# Patient Record
Sex: MALE | Race: WHITE | NOT HISPANIC OR LATINO | Employment: OTHER | URBAN - NONMETROPOLITAN AREA
[De-identification: names, ages, dates, MRNs, and addresses within clinical notes are randomized per-mention and may not be internally consistent; named-entity substitution may affect disease eponyms.]

---

## 2017-10-25 ENCOUNTER — OFFICE VISIT (OUTPATIENT)
Dept: CARDIOLOGY | Facility: CLINIC | Age: 52
End: 2017-10-25

## 2017-10-25 VITALS
HEIGHT: 70 IN | BODY MASS INDEX: 35.43 KG/M2 | DIASTOLIC BLOOD PRESSURE: 86 MMHG | SYSTOLIC BLOOD PRESSURE: 134 MMHG | OXYGEN SATURATION: 95 % | HEART RATE: 82 BPM | WEIGHT: 247.5 LBS

## 2017-10-25 DIAGNOSIS — G47.33 OBSTRUCTIVE SLEEP APNEA SYNDROME: ICD-10-CM

## 2017-10-25 DIAGNOSIS — E11.9 TYPE 2 DIABETES MELLITUS WITHOUT COMPLICATION, WITHOUT LONG-TERM CURRENT USE OF INSULIN (HCC): ICD-10-CM

## 2017-10-25 DIAGNOSIS — I10 ESSENTIAL HYPERTENSION: Primary | ICD-10-CM

## 2017-10-25 DIAGNOSIS — E78.2 MIXED HYPERLIPIDEMIA: ICD-10-CM

## 2017-10-25 PROCEDURE — 99213 OFFICE O/P EST LOW 20 MIN: CPT | Performed by: NURSE PRACTITIONER

## 2017-10-25 NOTE — PATIENT INSTRUCTIONS
Calorie Counting for Weight Loss  Calories are energy you get from the things you eat and drink. Your body uses this energy to keep you going throughout the day. The number of calories you eat affects your weight. When you eat more calories than your body needs, your body stores the extra calories as fat. When you eat fewer calories than your body needs, your body burns fat to get the energy it needs.  Calorie counting means keeping track of how many calories you eat and drink each day. If you make sure to eat fewer calories than your body needs, you should lose weight. In order for calorie counting to work, you will need to eat the number of calories that are right for you in a day to lose a healthy amount of weight per week. A healthy amount of weight to lose per week is usually 1-2 lb (0.5-0.9 kg). A dietitian can determine how many calories you need in a day and give you suggestions on how to reach your calorie goal.   WHAT IS MY MY PLAN?  My goal is to have __________ calories per day.   If I have this many calories per day, I should lose around __________ pounds per week.  WHAT DO I NEED TO KNOW ABOUT CALORIE COUNTING?  In order to meet your daily calorie goal, you will need to:  · Find out how many calories are in each food you would like to eat. Try to do this before you eat.  · Decide how much of the food you can eat.  · Write down what you ate and how many calories it had. Doing this is called keeping a food log.  WHERE DO I FIND CALORIE INFORMATION?  The number of calories in a food can be found on a Nutrition Facts label. Note that all the information on a label is based on a specific serving of the food. If a food does not have a Nutrition Facts label, try to look up the calories online or ask your dietitian for help.  HOW DO I DECIDE HOW MUCH TO EAT?  To decide how much of the food you can eat, you will need to consider both the number of calories in one serving and the size of one serving. This  information can be found on the Nutrition Facts label. If a food does not have a Nutrition Facts label, look up the information online or ask your dietitian for help.  Remember that calories are listed per serving. If you choose to have more than one serving of a food, you will have to multiply the calories per serving by the amount of servings you plan to eat. For example, the label on a package of bread might say that a serving size is 1 slice and that there are 90 calories in a serving. If you eat 1 slice, you will have eaten 90 calories. If you eat 2 slices, you will have eaten 180 calories.  HOW DO I KEEP A FOOD LOG?  After each meal, record the following information in your food log:  · What you ate.  · How much of it you ate.  · How many calories it had.  · Then, add up your calories.  Keep your food log near you, such as in a small notebook in your pocket. Another option is to use a mobile debby or website. Some programs will calculate calories for you and show you how many calories you have left each time you add an item to the log.  WHAT ARE SOME CALORIE COUNTING TIPS?  · Use your calories on foods and drinks that will fill you up and not leave you hungry. Some examples of this include foods like nuts and nut butters, vegetables, lean proteins, and high-fiber foods (more than 5 g fiber per serving).  · Eat nutritious foods and avoid empty calories. Empty calories are calories you get from foods or beverages that do not have many nutrients, such as candy and soda. It is better to have a nutritious high-calorie food (such as an avocado) than a food with few nutrients (such as a bag of chips).  · Know how many calories are in the foods you eat most often. This way, you do not have to look up how many calories they have each time you eat them.  · Look out for foods that may seem like low-calorie foods but are really high-calorie foods, such as baked goods, soda, and fat-free candy.  · Pay attention to calories  in drinks. Drinks such as sodas, specialty coffee drinks, alcohol, and juices have a lot of calories yet do not fill you up. Choose low-calorie drinks like water and diet drinks.  · Focus your calorie counting efforts on higher calorie items. Logging the calories in a garden salad that contains only vegetables is less important than calculating the calories in a milk shake.  · Find a way of tracking calories that works for you. Get creative. Most people who are successful find ways to keep track of how much they eat in a day, even if they do not count every calorie.  WHAT ARE SOME PORTION CONTROL TIPS?  · Know how many calories are in a serving. This will help you know how many servings of a certain food you can have.  · Use a measuring cup to measure serving sizes. This is helpful when you start out. With time, you will be able to estimate serving sizes for some foods.  · Take some time to put servings of different foods on your favorite plates, bowls, and cups so you know what a serving looks like.  · Try not to eat straight from a bag or box. Doing this can lead to overeating. Put the amount you would like to eat in a cup or on a plate to make sure you are eating the right portion.  · Use smaller plates, glasses, and bowls to prevent overeating. This is a quick and easy way to practice portion control. If your plate is smaller, less food can fit on it.  · Try not to multitask while eating, such as watching TV or using your computer. If it is time to eat, sit down at a table and enjoy your food. Doing this will help you to start recognizing when you are full. It will also make you more aware of what and how much you are eating.  HOW CAN I CALORIE COUNT WHEN EATING OUT?  · Ask for smaller portion sizes or child-sized portions.  · Consider sharing an entree and sides instead of getting your own entree.  · If you get your own entree, eat only half. Ask for a box at the beginning of your meal and put the rest of your  "entree in it so you are not tempted to eat it.  · Look for the calories on the menu. If calories are listed, choose the lower calorie options.  · Choose dishes that include vegetables, fruits, whole grains, low-fat dairy products, and lean protein. Focusing on smart food choices from each of the 5 food groups can help you stay on track at restaurants.  · Choose items that are boiled, broiled, grilled, or steamed.  · Choose water, milk, unsweetened iced tea, or other drinks without added sugars. If you want an alcoholic beverage, choose a lower calorie option. For example, a regular wendie can have up to 700 calories and a glass of wine has around 150.  · Stay away from items that are buttered, battered, fried, or served with cream sauce. Items labeled \"crispy\" are usually fried, unless stated otherwise.  · Ask for dressings, sauces, and syrups on the side. These are usually very high in calories, so do not eat much of them.  · Watch out for salads. Many people think salads are a healthy option, but this is often not the case. Many salads come with chaidez, fried chicken, lots of cheese, fried chips, and dressing. All of these items have a lot of calories. If you want a salad, choose a garden salad and ask for grilled meats or steak. Ask for the dressing on the side, or ask for olive oil and vinegar or lemon to use as dressing.  · Estimate how many servings of a food you are given. For example, a serving of cooked rice is ½ cup or about the size of half a tennis ball or one cupcake wrapper. Knowing serving sizes will help you be aware of how much food you are eating at restaurants. The list below tells you how big or small some common portion sizes are based on everyday objects.    1 oz--4 stacked dice.    3 oz--1 deck of cards.    1 tsp--1 dice.    1 Tbsp--½ a Ping-Pong ball.    2 Tbsp--1 Ping-Pong ball.    ½ cup--1 tennis ball or 1 cupcake wrapper.    1 cup--1 baseball.     This information is not intended to " replace advice given to you by your health care provider. Make sure you discuss any questions you have with your health care provider.     Document Released: 12/18/2006 Document Revised: 01/08/2016 Document Reviewed: 10/23/2014  [employer] Interactive Patient Education ©2017 [employer]

## 2017-10-25 NOTE — PROGRESS NOTES
Chief Complaint   Patient presents with   • Follow-up     For cardiac management   • Med Refill     PCP refills medication       Subjective       Jann Barnard is a 52 y.o. male with a history of diabetes, hypertension, hypercholesteremia, and mild metabolic syndrome. He was in 2015 for shortness of breath and fatigue.  His stress was negative for ischemia.  Coreg was added for better blood pressure and heart rate control.  He was advised at last visit to see Dr. Veliz for pulmonary management, but patient reported he had not been following with her, and can not wear his CPAP mask.    Today he comes to the office for a follow up visit and denies chest pain, palpitations or shortness of breath. He states he has felt better since retiring, but does continue to work at a less stressful job he enjoys.     HPI         Cardiac History:    Past Surgical History:   Procedure Laterality Date   • CARDIOVASCULAR STRESS TEST  02/26/2015    Stress Myoview- 9 min, 96% THR. 170/80. negative for ischemia, coreg added, recommended eval for sleep apnea   • ECHO - CONVERTED  02/26/2015    Echo- EF 65%, RVSP- 33mmHg       Current Outpatient Prescriptions   Medication Sig Dispense Refill   • carvedilol (COREG) 3.125 MG tablet Take 3.125 mg by mouth 2 (two) times a day with meals.     • glyBURIDE (DIABETA) 5 MG tablet Take 5 mg by mouth daily.     • lisinopril-hydrochlorothiazide (PRINZIDE,ZESTORETIC) 20-25 MG per tablet Take 1 tablet by mouth daily.     • metFORMIN (GLUCOPHAGE) 1000 MG tablet Take 1,000 mg by mouth daily with breakfast.     • simvastatin (ZOCOR) 20 MG tablet Take 20 mg by mouth every night.     • tiZANidine (ZANAFLEX) 4 MG tablet Take 4 mg by mouth every 8 (eight) hours as needed.       No current facility-administered medications for this visit.        Review of patient's allergies indicates no known allergies.    Past Medical History:   Diagnosis Date   • DDD (degenerative disc disease), cervical     s/p cervical  surgery   • Diabetes mellitus     non insulin dependent   • Dyslipidemia    • Hx of tonsillectomy    • Hypertension        Social History     Social History   • Marital status:      Spouse name: N/A   • Number of children: N/A   • Years of education: N/A     Occupational History   • Not on file.     Social History Main Topics   • Smoking status: Never Smoker   • Smokeless tobacco: Current User     Types: Snuff   • Alcohol use No   • Drug use: No   • Sexual activity: Not on file     Other Topics Concern   • Not on file     Social History Narrative       Family History   Problem Relation Age of Onset   • Hypertension Mother    • Heart attack Mother    • Diabetes Father    • Hypertension Father    • Hypertension Sister    • Hyperlipidemia Sister        Review of Systems   Constitution: Negative for decreased appetite and malaise/fatigue.   HENT: Negative for congestion and sore throat.    Eyes: Negative for blurred vision.   Cardiovascular: Negative for chest pain, dyspnea on exertion, irregular heartbeat, near-syncope and palpitations.   Respiratory: Positive for sleep disturbances due to breathing (unable to tolerate CPAP) and snoring (wife states better than used to be). Negative for shortness of breath.    Endocrine: Negative for cold intolerance and heat intolerance.   Hematologic/Lymphatic: Negative for adenopathy. Does not bruise/bleed easily.   Skin: Negative for itching and nail changes.   Musculoskeletal: Negative for arthritis and myalgias.   Gastrointestinal: Negative for abdominal pain, dysphagia and heartburn.   Genitourinary: Negative for dysuria and frequency.   Neurological: Negative for dizziness, light-headedness, seizures and vertigo.   Psychiatric/Behavioral: Negative for altered mental status.   Allergic/Immunologic: Negative for environmental allergies and hives.        Diabetes- Yes  Thyroid-normal    Objective     /86 (BP Location: Left arm, Patient Position: Sitting, Cuff Size:  "Adult)  Pulse 82  Ht 70\" (177.8 cm)  Wt 247 lb 8 oz (112 kg)  SpO2 95%  BMI 35.51 kg/m2    Physical Exam   Constitutional: He is oriented to person, place, and time. He appears well-nourished.   HENT:   Head: Normocephalic.   Eyes: Conjunctivae are normal. Pupils are equal, round, and reactive to light.   Neck: Normal range of motion. Neck supple. No JVD present.   Cardiovascular: Normal rate, regular rhythm, S1 normal and normal pulses.    No murmur heard.  Slightly loud S2   Pulmonary/Chest: Effort normal and breath sounds normal. He has no wheezes. He has no rales.   Abdominal: Soft. Bowel sounds are normal. He exhibits no distension. There is no tenderness.   Musculoskeletal: Normal range of motion. He exhibits no edema.   Neurological: He is alert and oriented to person, place, and time.   Skin: Skin is warm and dry. No rash noted. No pallor.   Psychiatric: He has a normal mood and affect. His behavior is normal. Judgment and thought content normal.          Procedures          Assessment/Plan      Jann was seen today for follow-up and med refill.    Diagnoses and all orders for this visit:    Essential hypertension    Mixed hyperlipidemia    Obstructive sleep apnea syndrome    Type 2 diabetes mellitus without complication, without long-term current use of insulin      He was unable to tolerate CPAP therapy for sleep apnea after numerous attempts with different mask. He has been watching his diet better and lost about 10 pounds recently. I encouraged him to continue diet effort and diet information given to him. I also suggested Medeterian type diet. His blood pressure, heart rate and rhythm are normal. He denies cardiac symptoms. Same medications recommended at this time and no cardiac testing warranted.  He follows with you for management of labs and medication refills. We will plan to see him annually or sooner for problems.              Electronically signed by MARY Freitas,  October 25, " 2017 3:45 PM

## 2018-06-26 ENCOUNTER — OUTSIDE FACILITY SERVICE (OUTPATIENT)
Dept: CARDIOLOGY | Facility: CLINIC | Age: 53
End: 2018-06-26

## 2018-06-26 PROCEDURE — 78452 HT MUSCLE IMAGE SPECT MULT: CPT | Performed by: INTERNAL MEDICINE

## 2018-06-26 PROCEDURE — 93018 CV STRESS TEST I&R ONLY: CPT | Performed by: INTERNAL MEDICINE

## 2018-06-26 PROCEDURE — 93306 TTE W/DOPPLER COMPLETE: CPT | Performed by: INTERNAL MEDICINE

## 2018-06-27 ENCOUNTER — OUTSIDE FACILITY SERVICE (OUTPATIENT)
Dept: CARDIOLOGY | Facility: CLINIC | Age: 53
End: 2018-06-27

## 2018-06-27 PROCEDURE — 99213 OFFICE O/P EST LOW 20 MIN: CPT | Performed by: INTERNAL MEDICINE

## 2018-07-17 ENCOUNTER — OFFICE VISIT (OUTPATIENT)
Dept: CARDIOLOGY | Facility: CLINIC | Age: 53
End: 2018-07-17

## 2018-07-17 VITALS
WEIGHT: 246 LBS | HEIGHT: 70 IN | HEART RATE: 88 BPM | BODY MASS INDEX: 35.22 KG/M2 | DIASTOLIC BLOOD PRESSURE: 78 MMHG | SYSTOLIC BLOOD PRESSURE: 126 MMHG

## 2018-07-17 DIAGNOSIS — E78.2 MIXED HYPERLIPIDEMIA: ICD-10-CM

## 2018-07-17 DIAGNOSIS — I10 ESSENTIAL HYPERTENSION: Primary | ICD-10-CM

## 2018-07-17 DIAGNOSIS — G47.33 OBSTRUCTIVE SLEEP APNEA SYNDROME: ICD-10-CM

## 2018-07-17 DIAGNOSIS — E11.9 TYPE 2 DIABETES MELLITUS WITHOUT COMPLICATION, WITHOUT LONG-TERM CURRENT USE OF INSULIN (HCC): ICD-10-CM

## 2018-07-17 PROCEDURE — 99213 OFFICE O/P EST LOW 20 MIN: CPT | Performed by: NURSE PRACTITIONER

## 2018-07-17 RX ORDER — ASPIRIN 81 MG/1
81 TABLET ORAL DAILY
COMMUNITY

## 2018-07-17 RX ORDER — ATORVASTATIN CALCIUM 40 MG/1
40 TABLET, FILM COATED ORAL DAILY
COMMUNITY

## 2018-07-17 RX ORDER — NITROGLYCERIN 0.4 MG/1
0.4 TABLET SUBLINGUAL
COMMUNITY
End: 2021-01-26 | Stop reason: SDUPTHER

## 2018-07-17 NOTE — PATIENT INSTRUCTIONS
Nitroglycerin sublingual tablets  What is this medicine?  NITROGLYCERIN (chang troe GLI ser in) is a type of vasodilator. It relaxes blood vessels, increasing the blood and oxygen supply to your heart. This medicine is used to relieve chest pain caused by angina. It is also used to prevent chest pain before activities like climbing stairs, going outdoors in cold weather, or sexual activity.  This medicine may be used for other purposes; ask your health care provider or pharmacist if you have questions.  COMMON BRAND NAME(S): Nitroquick, Nitrostat, Nitrotab  What should I tell my health care provider before I take this medicine?  They need to know if you have any of these conditions:  -anemia  -head injury, recent stroke, or bleeding in the brain  -liver disease  -previous heart attack  -an unusual or allergic reaction to nitroglycerin, other medicines, foods, dyes, or preservatives  -pregnant or trying to get pregnant  -breast-feeding  How should I use this medicine?  Take this medicine by mouth as needed. At the first sign of an angina attack (chest pain or tightness) place one tablet under your tongue. You can also take this medicine 5 to 10 minutes before an event likely to produce chest pain. Follow the directions on the prescription label. Let the tablet dissolve under the tongue. Do not swallow whole. Replace the dose if you accidentally swallow it. It will help if your mouth is not dry. Saliva around the tablet will help it to dissolve more quickly. Do not eat or drink, smoke or chew tobacco while a tablet is dissolving. If you are not better within 5 minutes after taking ONE dose of nitroglycerin, call 9-1-1 immediately to seek emergency medical care. Do not take more than 3 nitroglycerin tablets over 15 minutes.  If you take this medicine often to relieve symptoms of angina, your doctor or health care professional may provide you with different instructions to manage your symptoms. If symptoms do not go away  after following these instructions, it is important to call 9-1-1 immediately. Do not take more than 3 nitroglycerin tablets over 15 minutes.  Talk to your pediatrician regarding the use of this medicine in children. Special care may be needed.  Overdosage: If you think you have taken too much of this medicine contact a poison control center or emergency room at once.  NOTE: This medicine is only for you. Do not share this medicine with others.  What if I miss a dose?  This does not apply. This medicine is only used as needed.  What may interact with this medicine?  Do not take this medicine with any of the following medications:  -certain migraine medicines like ergotamine and dihydroergotamine (DHE)  -medicines used to treat erectile dysfunction like sildenafil, tadalafil, and vardenafil  -riociguat  This medicine may also interact with the following medications:  -alteplase  -aspirin  -heparin  -medicines for high blood pressure  -medicines for mental depression  -other medicines used to treat angina  -phenothiazines like chlorpromazine, mesoridazine, prochlorperazine, thioridazine  This list may not describe all possible interactions. Give your health care provider a list of all the medicines, herbs, non-prescription drugs, or dietary supplements you use. Also tell them if you smoke, drink alcohol, or use illegal drugs. Some items may interact with your medicine.  What should I watch for while using this medicine?  Tell your doctor or health care professional if you feel your medicine is no longer working.  Keep this medicine with you at all times. Sit or lie down when you take your medicine to prevent falling if you feel dizzy or faint after using it. Try to remain calm. This will help you to feel better faster. If you feel dizzy, take several deep breaths and lie down with your feet propped up, or bend forward with your head resting between your knees.  You may get drowsy or dizzy. Do not drive, use machinery,  or do anything that needs mental alertness until you know how this drug affects you. Do not stand or sit up quickly, especially if you are an older patient. This reduces the risk of dizzy or fainting spells. Alcohol can make you more drowsy and dizzy. Avoid alcoholic drinks.  Do not treat yourself for coughs, colds, or pain while you are taking this medicine without asking your doctor or health care professional for advice. Some ingredients may increase your blood pressure.  What side effects may I notice from receiving this medicine?  Side effects that you should report to your doctor or health care professional as soon as possible:  -blurred vision  -dry mouth  -skin rash  -sweating  -the feeling of extreme pressure in the head  -unusually weak or tired  Side effects that usually do not require medical attention (report to your doctor or health care professional if they continue or are bothersome):  -flushing of the face or neck  -headache  -irregular heartbeat, palpitations  -nausea, vomiting  This list may not describe all possible side effects. Call your doctor for medical advice about side effects. You may report side effects to FDA at 5-082-FDA-8756.  Where should I keep my medicine?  Keep out of the reach of children.  Store at room temperature between 20 and 25 degrees C (68 and 77 degrees F). Store in original container. Protect from light and moisture. Keep tightly closed. Throw away any unused medicine after the expiration date.  NOTE: This sheet is a summary. It may not cover all possible information. If you have questions about this medicine, talk to your doctor, pharmacist, or health care provider.  © 2018 [employer] (2014-10-16 17:57:36)

## 2018-07-17 NOTE — PROGRESS NOTES
Chief Complaint   Patient presents with   • Hospital Follow-up     Patient was in hospital about 3 weeks ago with chest pain. Was given a dose of toradol in the hospital which relieved CP- has not had anymore episodes since. PCP refills meds. Didn't bring med list.        Subjective       Jann Barnard is a 53 y.o. male  with a history of diabetes, hypertension, hypercholesteremia, and mild metabolic syndrome. He was in 2015 for shortness of breath and fatigue.  His stress was negative for ischemia.  Coreg was added for better blood pressure and heart rate control.  He was advised to see Dr. Veliz for pulmonary management, but he did not as he states unable to wear CPAP mask. He reports after snf from his work, he has felt better. IN June 2018, he presented to ER at [facility] with complaint of severe chest pain. Labs were negative. Stress and echo were done and no ischemia noted. After treatment with IV Toradol, the pain resolved.   Today he comes to the office for a hospital follow up visit and denies reoccurrence of chest pain. He has resumed his normal daily activities without issue. No palpitations, dizziness or shortness of breath reported. His wife states he has been maintaining healthier diet.     HPI     Cardiac History:    Past Surgical History:   Procedure Laterality Date   • CARDIOVASCULAR STRESS TEST  02/26/2015    Stress Myoview- 9 min, 96% THR. 170/80. negative for ischemia, coreg added, recommended eval for sleep apnea   • ECHO - CONVERTED  02/26/2015    Echo- EF 65%, RVSP- 33mmHg       Current Outpatient Prescriptions   Medication Sig Dispense Refill   • aspirin 81 MG EC tablet Take 81 mg by mouth Daily.     • atorvastatin (LIPITOR) 40 MG tablet Take 40 mg by mouth Daily.     • carvedilol (COREG) 3.125 MG tablet Take 3.125 mg by mouth 2 (two) times a day with meals.     • glyBURIDE (DIABETA) 5 MG tablet Take 5 mg by mouth daily.     • lisinopril-hydrochlorothiazide (PRINZIDE,ZESTORETIC) 20-25 MG  per tablet Take 1 tablet by mouth daily.     • metFORMIN (GLUCOPHAGE) 1000 MG tablet Take 1,000 mg by mouth daily with breakfast.     • nitroglycerin (NITROSTAT) 0.4 MG SL tablet Place 0.4 mg under the tongue Every 5 (Five) Minutes As Needed for Chest Pain. Take no more than 3 doses in 15 minutes.     • tiZANidine (ZANAFLEX) 4 MG tablet Take 4 mg by mouth every 8 (eight) hours as needed.       No current facility-administered medications for this visit.        Patient has no known allergies.    Past Medical History:   Diagnosis Date   • DDD (degenerative disc disease), cervical     s/p cervical surgery   • Diabetes mellitus (CMS/HCC)     non insulin dependent   • Dyslipidemia    • Hx of tonsillectomy    • Hypertension        Social History     Social History   • Marital status:      Spouse name: N/A   • Number of children: N/A   • Years of education: N/A     Occupational History   • Not on file.     Social History Main Topics   • Smoking status: Never Smoker   • Smokeless tobacco: Current User     Types: Snuff   • Alcohol use No   • Drug use: No   • Sexual activity: Not on file     Other Topics Concern   • Not on file     Social History Narrative   • No narrative on file       Family History   Problem Relation Age of Onset   • Hypertension Mother    • Heart attack Mother    • Diabetes Father    • Hypertension Father    • Hypertension Sister    • Hyperlipidemia Sister        Review of Systems   Constitution: Negative for decreased appetite and malaise/fatigue.   HENT: Negative for congestion, hoarse voice, nosebleeds and sore throat.    Eyes: Negative for blurred vision.   Cardiovascular: Negative for chest pain, leg swelling, near-syncope and palpitations.   Respiratory: Positive for snoring (wife states has been better). Negative for shortness of breath.    Endocrine: Negative for cold intolerance and heat intolerance.   Hematologic/Lymphatic: Negative for bleeding problem. Does not bruise/bleed easily.  "  Skin: Negative for color change, dry skin and itching.   Musculoskeletal: Positive for back pain (by history) and joint pain. Negative for myalgias.   Gastrointestinal: Negative for abdominal pain, change in bowel habit, dysphagia, heartburn, melena and nausea.   Genitourinary: Negative for dysuria and hematuria.   Neurological: Negative for dizziness and light-headedness.   Psychiatric/Behavioral: Negative for altered mental status. The patient does not have insomnia.    Allergic/Immunologic: Negative for environmental allergies and hives.        Objective     /78   Pulse 88   Ht 177.8 cm (70\")   Wt 112 kg (246 lb)   BMI 35.30 kg/m²     Physical Exam   Constitutional: He is oriented to person, place, and time. He appears well-developed and well-nourished.   HENT:   Head: Normocephalic.   Eyes: Conjunctivae are normal. Pupils are equal, round, and reactive to light.   Neck: Normal range of motion. Neck supple. Carotid bruit is not present.   Cardiovascular: Normal rate, regular rhythm, S1 normal and S2 normal.    No murmur heard.  Pulmonary/Chest: Breath sounds normal.   Abdominal: Soft. Bowel sounds are normal.   Musculoskeletal: Normal range of motion.   Neurological: He is alert and oriented to person, place, and time.   Skin: Skin is warm and dry. No pallor.   Psychiatric: He has a normal mood and affect. His behavior is normal.   Vitals reviewed.       Procedures :none today        Assessment/Plan      Jann was seen today for hospital follow-up.    Diagnoses and all orders for this visit:    Essential hypertension    Mixed hyperlipidemia    Type 2 diabetes mellitus without complication, without long-term current use of insulin (CMS/[facility])    Obstructive sleep apnea syndrome       The reports of his recent stress test and echocardiogram were reviewed which showed normal LV function and no ischemia. Advised to continue daily aspirin for cardiac prevention.     He asked if he should take Pepcid which " was mentioned during hospital stay. He currently denies any GERD type symptoms. I advised him to further discuss with you.     Blood pressure, heart rate and rhythm are normal today. Continue same does Coreg, and Zestoretic.     For lipid management, his statin was recently changed to Lipitor. He is taking without side effects noted. I advised him to continue the same and he will follow with you for management of labs.      Patient's Body mass index is 35.3 kg/m². BMI is above normal parameters. Recommendations include: nutrition counseling. Diet for weight loss encouraged as well as diabetic/heart healthy diet.       He has history of sleep apnea but unable to tolerate CPAP therapy. He denies any worsen symptoms of hypoxia. No morning fatigue or headaches noted. Weight loss encouraged to also help control symptoms of sleep apnea.     We will plan to see him back in 6 months. Please call sooner for problems.            Electronically signed by MARY Freitas,  July 17, 2018 4:05 PM

## 2019-01-22 ENCOUNTER — OFFICE VISIT (OUTPATIENT)
Dept: CARDIOLOGY | Facility: CLINIC | Age: 54
End: 2019-01-22

## 2019-01-22 VITALS
DIASTOLIC BLOOD PRESSURE: 78 MMHG | HEIGHT: 70 IN | WEIGHT: 246 LBS | BODY MASS INDEX: 35.22 KG/M2 | SYSTOLIC BLOOD PRESSURE: 118 MMHG | HEART RATE: 80 BPM

## 2019-01-22 DIAGNOSIS — G47.33 OBSTRUCTIVE SLEEP APNEA SYNDROME: ICD-10-CM

## 2019-01-22 DIAGNOSIS — E66.9 OBESITY (BMI 30-39.9): ICD-10-CM

## 2019-01-22 DIAGNOSIS — I10 ESSENTIAL HYPERTENSION: Primary | ICD-10-CM

## 2019-01-22 DIAGNOSIS — E11.9 TYPE 2 DIABETES MELLITUS WITHOUT COMPLICATION, WITHOUT LONG-TERM CURRENT USE OF INSULIN (HCC): ICD-10-CM

## 2019-01-22 DIAGNOSIS — E78.2 MIXED HYPERLIPIDEMIA: ICD-10-CM

## 2019-01-22 PROCEDURE — 99213 OFFICE O/P EST LOW 20 MIN: CPT | Performed by: NURSE PRACTITIONER

## 2019-01-22 NOTE — PATIENT INSTRUCTIONS
What You Need to Know About Smokeless Tobacco Use  Tobacco use is one of the leading causes of cancer and other chronic health problems. Smokeless tobacco is tobacco that is put directly into the mouth instead of being smoked. It may also be called chewing tobacco or snuff. Smokeless tobacco is made from the leaves of tobacco plants and it comes in several forms:  · Loose, dry leaves, plugs, or twists.  · Moist pouches.  · Dissolving lozenges or strips.    Chewing, sucking, or holding the tobacco in your mouth causes your mouth to make more saliva. The saliva mixes with the tobacco to make “tobacco juice” that is swallowed or spit out.  How can smokeless tobacco affect me?  Using smokeless tobacco:  · Increases your risk of developing cancer. Smokeless tobacco contains at least 28 different types of cancer-causing chemicals (carcinogens).  · Increases your chances of developing other long-term health problems, including high blood pressure, heart disease, stroke, and dental problems.  · Can make you become addicted. Nicotine is one of the chemicals in tobacco. When you chew tobacco, you absorb nicotine from the tobacco juice. This can make you feel more alert than usual.  · Can cause problems with pregnancy. Pregnant women who use smokeless tobacco are more likely to miscarry or deliver a baby too early (premature delivery).  · Can affect the appearance and health of your mouth. Using smokeless tobacco may cause bad breath, yellow-brown teeth, mouth sores, cracking and bleeding lips, gum recession, and lesions on the soft tissues of your mouth (leukoplakia).    What are the benefits of not using smokeless tobacco?  The benefits of not using smokeless tobacco include:  · A healthy mind because:  ? You avoid addiction.  · A healthy body because:  ? You avoid dental problems.  ? You promote healthy pregnancy.  ? You avoid long-term health problems.  · A healthy wallet because:  ? You avoid costs of buying  tobacco.  ? You avoid health care costs in the future.  · A healthy family because:  ? You avoid accidental poisoning of children in your household.    What can happen if I continue to use smokeless tobacco?  If you continue to use smokeless tobacco, you will increase your risk for developing certain cancers. These include:  · Tongue.  · Lips, mouth, and gums.  · Throat (esophagus) and voice box (larynx).  · Stomach.  · Pancreas.  · Bladder.  · Colon.    Long-term use of smokeless tobacco can also lead to:  · High blood pressure, heart disease, and stroke.  · Gum disease, gum recession, and bone loss around the teeth.  · Tooth decay.    How do I quit using smokeless tobacco?  Quitting the use of smokeless tobacco can be hard, but it can be done. Follow these steps:  · Pick a date to quit. Set a date within the next two weeks. This gives you time to prepare.  · Write down the reasons why you are quitting. Keep this list in places where you will see it often, such as on your bathroom mirror or in your car or wallet.  · Identify the people, places, things, and activities that make you want to use tobacco (triggers) and avoid them.  · Get rid of any tobacco you have and remove any tobacco smells. To do this:  ? Throw away all containers of tobacco at home, at work, and in your car.  ? Throw away any other items that you use regularly when you chew tobacco.  ? Clean your car and make sure to remove all tobacco-related items.  ? Clean your home, including curtains and carpets.  · Tell your family, friends, and coworkers that you are quitting. This can make quitting easier.  · Ask your health care provider for help quitting smokeless tobacco. This may involve treatment. Find out what treatment options are covered by your health insurance.  · Keep track of how many days have passed since you quit. Remembering how long and hard you have worked to quit can help you avoid using tobacco again.    Where can I get support?  Ask  your health care provider if there is a local support group for quitting smokeless tobacco.  Where can I get more information?  You can learn more about the risks of using smokeless tobacco and the benefits of quitting from these sources:  · National [employer]: www.cancer.gov  · American Cancer Society: www.cancer.org    When should I seek medical care?  Seek medical care if you have:  · White or other discolored patches in your mouth.  · Difficulty swallowing.  · A change in your voice.  · Unexplained weight loss.  · Stomach pain, nausea, or vomiting.    Summary  · Smokeless tobacco contains at least 28 different chemicals that are known to cause cancer (carcinogen).  · Nicotine is an addictive chemical in smokeless tobacco.  · When you quit using smokeless tobacco, you lower your risk of developing cancer.  This information is not intended to replace advice given to you by your health care provider. Make sure you discuss any questions you have with your health care provider.  Document Released: 05/21/2012 Document Revised: 08/12/2017 Document Reviewed: 07/29/2016  [employer] Interactive Patient Education © 2018 [employer]

## 2019-01-22 NOTE — PROGRESS NOTES
Chief Complaint   Patient presents with   • Follow-up     cardiac managment.  Pt is on daily ASA.  Pt doing well.  No more CP.     • Med Refill     no refills needed.  PCP fills meds.  Verbally went over meds.       Subjective       Jann Barnard is a 53 y.o. male  with a history of diabetes, hypertension, hypercholesteremia, and mild metabolic syndrome. He was in 2015 for shortness of breath and fatigue.  His stress was negative for ischemia.  Coreg was added for better blood pressure and heart rate control.  He was advised to see Dr. Veliz for pulmonary management, but he did not as he states unable to wear CPAP mask. He reports after [facility] from his work, he has felt better. IN June 2018, he presented to ER at [facility] with complaint of severe chest pain. Labs were negative. Stress and echo were done and no ischemia noted. After treatment with IV Toradol, the pain resolved.     Today he comes to the office for a follow up visit. He states he has been feeling well since his last visit, no chest pain, palpitations, dizziness or shortness of breath. He continues to work and farm. No recent medication changes reported.     HPI     Cardiac History:    Past Surgical History:   Procedure Laterality Date   • CARDIOVASCULAR STRESS TEST  02/26/2015    Stress Myoview- 9 min, 96% THR. 170/80. negative for ischemia, coreg added, recommended eval for sleep apnea   • CARDIOVASCULAR STRESS TEST  06/26/2018    Lexiscan- negative for ischemia   • ECHO - CONVERTED  02/26/2015    Echo- EF 65%, RVSP- 33mmHg   • ECHO - CONVERTED  06/26/2018    LVEF 60-65%, normal diastolic function, trace to mild MR       Current Outpatient Medications   Medication Sig Dispense Refill   • aspirin 81 MG EC tablet Take 81 mg by mouth Daily.     • atorvastatin (LIPITOR) 40 MG tablet Take 40 mg by mouth Daily.     • carvedilol (COREG) 3.125 MG tablet Take 3.125 mg by mouth 2 (two) times a day with meals.     • glyBURIDE (DIABETA) 5 MG tablet Take 5 mg  by mouth daily.     • lisinopril-hydrochlorothiazide (PRINZIDE,ZESTORETIC) 20-25 MG per tablet Take 1 tablet by mouth daily.     • metFORMIN (GLUCOPHAGE) 1000 MG tablet Take 1,000 mg by mouth daily with breakfast.     • nitroglycerin (NITROSTAT) 0.4 MG SL tablet Place 0.4 mg under the tongue Every 5 (Five) Minutes As Needed for Chest Pain. Take no more than 3 doses in 15 minutes.     • tiZANidine (ZANAFLEX) 4 MG tablet Take 4 mg by mouth every 8 (eight) hours as needed.       No current facility-administered medications for this visit.        Patient has no known allergies.    Past Medical History:   Diagnosis Date   • DDD (degenerative disc disease), cervical     s/p cervical surgery   • Diabetes mellitus (CMS/HCC)     non insulin dependent   • Dyslipidemia    • Hx of tonsillectomy    • Hypertension        Social History     Socioeconomic History   • Marital status:      Spouse name: Not on file   • Number of children: Not on file   • Years of education: Not on file   • Highest education level: Not on file   Social Needs   • Financial resource strain: Not on file   • Food insecurity - worry: Not on file   • Food insecurity - inability: Not on file   • Transportation needs - medical: Not on file   • Transportation needs - non-medical: Not on file   Occupational History   • Not on file   Tobacco Use   • Smoking status: Never Smoker   • Smokeless tobacco: Current User     Types: Snuff   Substance and Sexual Activity   • Alcohol use: No   • Drug use: No   • Sexual activity: Not on file   Other Topics Concern   • Not on file   Social History Narrative   • Not on file       Family History   Problem Relation Age of Onset   • Hypertension Mother    • Heart attack Mother    • Diabetes Father    • Hypertension Father    • Hypertension Sister    • Hyperlipidemia Sister        Review of Systems   Constitution: Negative for decreased appetite, weakness and malaise/fatigue.   HENT: Negative for congestion and nosebleeds.   "  Eyes: Negative for redness and visual disturbance.   Cardiovascular: Negative for chest pain, irregular heartbeat, leg swelling, near-syncope and palpitations.   Respiratory: Positive for shortness of breath.    Endocrine: Negative for polydipsia, polyphagia and polyuria.   Hematologic/Lymphatic: Negative for bleeding problem. Does not bruise/bleed easily.   Skin: Negative for color change, dry skin and itching.   Musculoskeletal: Positive for joint pain. Negative for falls.   Gastrointestinal: Negative for abdominal pain, dysphagia and heartburn.   Genitourinary: Negative for dysuria and hematuria.   Neurological: Negative for dizziness, headaches and loss of balance.   Psychiatric/Behavioral: The patient does not have insomnia and is not nervous/anxious.         Objective     /78 (BP Location: Right arm)   Pulse 80   Ht 177.8 cm (70\")   Wt 112 kg (246 lb)   BMI 35.30 kg/m²     Physical Exam   Constitutional: He is oriented to person, place, and time. He appears well-nourished.   HENT:   Head: Normocephalic.   Eyes: Conjunctivae are normal. Pupils are equal, round, and reactive to light.   Neck: Normal range of motion. Neck supple.   Cardiovascular: Normal rate, regular rhythm, S1 normal and S2 normal.   No murmur heard.  Pulmonary/Chest: Breath sounds normal. He has no wheezes. He has no rales.   Abdominal: Soft. Bowel sounds are normal.   Musculoskeletal: Normal range of motion. He exhibits no edema.   Neurological: He is alert and oriented to person, place, and time.   Skin: Skin is warm and dry. No pallor.   Psychiatric: He has a normal mood and affect.      Procedures: none today      Assessment/Plan      Jann was seen today for follow-up and med refill.    Diagnoses and all orders for this visit:    Essential hypertension    Mixed hyperlipidemia    Obstructive sleep apnea syndrome    Type 2 diabetes mellitus without complication, without long-term current use of insulin (CMS/[facility])    Obesity " (BMI 30-39.9)      His blood pressure, heart rate and rhythm are normal. Advised to continue same dose Coreg, Lisinopril and HCTZ.     For cholesterol, he is taking Lipitor and will follow with you for lab orders including lipid panel and LFT.     Patient's Body mass index is 35.3 kg/m². BMI is above normal parameters. Recommendations include: nutrition counseling. Diet for weight loss encouraged.      He does not smoke but uses smokeless tobacco in form of dip. Handout given on risk associated with smokeless tobacco use. I encouraged him on cessation.     A 6 month follow up visit scheduled. Please call sooner for any cardiac concerns.            Electronically signed by MARY Freitas,  January 22, 2019 4:06 PM

## 2019-07-23 ENCOUNTER — OFFICE VISIT (OUTPATIENT)
Dept: CARDIOLOGY | Facility: CLINIC | Age: 54
End: 2019-07-23

## 2019-07-23 VITALS
HEIGHT: 70 IN | HEART RATE: 80 BPM | BODY MASS INDEX: 33.18 KG/M2 | SYSTOLIC BLOOD PRESSURE: 112 MMHG | DIASTOLIC BLOOD PRESSURE: 76 MMHG | WEIGHT: 231.8 LBS

## 2019-07-23 DIAGNOSIS — G47.33 OBSTRUCTIVE SLEEP APNEA SYNDROME: ICD-10-CM

## 2019-07-23 DIAGNOSIS — E78.2 MIXED HYPERLIPIDEMIA: ICD-10-CM

## 2019-07-23 DIAGNOSIS — E11.9 TYPE 2 DIABETES MELLITUS WITHOUT COMPLICATION, WITHOUT LONG-TERM CURRENT USE OF INSULIN (HCC): ICD-10-CM

## 2019-07-23 DIAGNOSIS — I10 ESSENTIAL HYPERTENSION: Primary | ICD-10-CM

## 2019-07-23 PROCEDURE — 99213 OFFICE O/P EST LOW 20 MIN: CPT | Performed by: NURSE PRACTITIONER

## 2019-07-23 NOTE — PATIENT INSTRUCTIONS
Mediterranean Diet  A Mediterranean diet refers to food and lifestyle choices that are based on the traditions of countries located on the [place]. This way of eating has been shown to help prevent certain conditions and improve outcomes for people who have chronic diseases, like kidney disease and heart disease.  What are tips for following this plan?  Lifestyle  · Cook and eat meals together with your family, when possible.  · Drink enough fluid to keep your urine clear or pale yellow.  · Be physically active every day. This includes:  ? Aerobic exercise like running or swimming.  ? Leisure activities like gardening, walking, or housework.  · Get 7-8 hours of sleep each night.  · If recommended by your health care provider, drink red wine in moderation. This means 1 glass a day for nonpregnant women and 2 glasses a day for men. A glass of wine equals 5 oz (150 mL).  Reading food labels  · Check the serving size of packaged foods. For foods such as rice and pasta, the serving size refers to the amount of cooked product, not dry.  · Check the total fat in packaged foods. Avoid foods that have saturated fat or trans fats.  · Check the ingredients list for added sugars, such as corn syrup.  Shopping  · At the grocery store, buy most of your food from the areas near the walls of the store. This includes:  ? Fresh fruits and vegetables (produce).  ? Grains, beans, nuts, and seeds. Some of these may be available in unpackaged forms or large amounts (in bulk).  ? Fresh seafood.  ? Poultry and eggs.  ? Low-fat dairy products.  · Buy whole ingredients instead of prepackaged foods.  · Buy fresh fruits and vegetables in-season from local farmers markets.  · Buy frozen fruits and vegetables in resealable bags.  · If you do not have access to quality fresh seafood, buy precooked frozen shrimp or canned fish, such as tuna, salmon, or sardines.  · Buy small amounts of raw or cooked vegetables, salads, or olives from the  deli or salad bar at your store.  · Stock your pantry so you always have certain foods on hand, such as olive oil, canned tuna, canned tomatoes, rice, pasta, and beans.  Cooking  · Cook foods with extra-virgin olive oil instead of using butter or other vegetable oils.  · Have meat as a side dish, and have vegetables or grains as your main dish. This means having meat in small portions or adding small amounts of meat to foods like pasta or stew.  · Use beans or vegetables instead of meat in common dishes like chili or lasagna.  · [place] with different cooking methods. Try roasting or broiling vegetables instead of steaming or sautéeing them.  · Add frozen vegetables to soups, stews, pasta, or rice.  · Add nuts or seeds for added healthy fat at each meal. You can add these to yogurt, salads, or vegetable dishes.  · Marinate fish or vegetables using olive oil, lemon juice, garlic, and fresh herbs.  Meal planning  · Plan to eat 1 vegetarian meal one day each week. Try to work up to 2 vegetarian meals, if possible.  · Eat seafood 2 or more times a week.  · Have healthy snacks readily available, such as:  ? Vegetable sticks with hummus.  ? Greek yogurt.  ? Fruit and nut trail mix.  · Eat balanced meals throughout the week. This includes:  ? Fruit: 2-3 servings a day  ? Vegetables: 4-5 servings a day  ? Low-fat dairy: 2 servings a day  ? Fish, poultry, or lean meat: 1 serving a day  ? Beans and legumes: 2 or more servings a week  ? Nuts and seeds: 1-2 servings a day  ? Whole grains: 6-8 servings a day  ? Extra-virgin olive oil: 3-4 servings a day  · Limit red meat and sweets to only a few servings a month  What are my food choices?  · Mediterranean diet  ? Recommended  ? Grains: Whole-grain pasta. Brown rice. Bulgar wheat. Polenta. Couscous. Whole-wheat bread. Oatmeal. Quinoa.  ? Vegetables: Artichokes. Beets. Broccoli. Cabbage. Carrots. Eggplant. Green beans. Chard. Kale. Spinach. Onions. Leeks. Peas. Squash.  Tomatoes. Peppers. Radishes.  ? Fruits: Apples. Apricots. Avocado. Berries. Bananas. Cherries. Dates. Figs. Grapes. Maria A. Melon. Oranges. Peaches. Plums. Pomegranate.  ? Meats and other protein foods: Beans. Almonds. Sunflower seeds. Pine nuts. Peanuts. Cod. [place]. Scallops. Shrimp. Tuna. Tilapia. Clams. Oysters. Eggs.  ? Dairy: Low-fat milk. Cheese. Greek yogurt.  ? Beverages: Water. Red wine. Herbal tea.  ? Fats and oils: Extra virgin olive oil. Avocado oil. Grape seed oil.  ? Sweets and desserts: Greek yogurt with honey. Baked apples. Poached pears. Trail mix.  ? Seasoning and other foods: Basil. Cilantro. Coriander. Cumin. Mint. Parsley. Jonathan. Rosemary. Tarragon. Garlic. Oregano. Thyme. Pepper. Balsalmic vinegar. Tahini. Hummus. Tomato sauce. Olives. Mushrooms.  ? Limit these  ? Grains: Prepackaged pasta or rice dishes. Prepackaged cereal with added sugar.  ? Vegetables: Deep fried potatoes (french fries).  ? Fruits: Fruit canned in syrup.  ? Meats and other protein foods: Beef. Pork. Lamb. Poultry with skin. Hot dogs. [place].  ? Dairy: Ice cream. Sour cream. Whole milk.  ? Beverages: Juice. Sugar-sweetened soft drinks. Beer. Liquor and spirits.  ? Fats and oils: Butter. Canola oil. Vegetable oil. Beef fat (tallow). Lard.  ? Sweets and desserts: Cookies. Cakes. Pies. Candy.  ? Seasoning and other foods: Mayonnaise. Premade sauces and marinades.  ? The items listed may not be a complete list. Talk with your dietitian about what dietary choices are right for you.  Summary  · The Mediterranean diet includes both food and lifestyle choices.  · Eat a variety of fresh fruits and vegetables, beans, nuts, seeds, and whole grains.  · Limit the amount of red meat and sweets that you eat.  · Talk with your health care provider about whether it is safe for you to drink red wine in moderation. This means 1 glass a day for nonpregnant women and 2 glasses a day for men. A glass of wine equals 5 oz (150 mL).  This information  is not intended to replace advice given to you by your health care provider. Make sure you discuss any questions you have with your health care provider.  Document Released: 08/10/2017 Document Revised: 09/12/2017 Document Reviewed: 08/10/2017  [employer] Interactive Patient Education © 2019 [employer]

## 2019-07-23 NOTE — PROGRESS NOTES
Chief Complaint   Patient presents with   • Follow-up     Cardiac management. He is having labs today for PCP. He has been dieting, and trying to loose weight. Wife verbalized current medication list, PCP writes refills on medication.       Subjective       Jann Barnard is a 54 y.o. male with a history of diabetes, hypertension, hypercholesteremia, and mild metabolic syndrome. He was referred in 2015 for shortness of breath and fatigue.  Stress was negative for ischemia.  Coreg was added for better blood pressure and heart rate control.  He was advised to see Dr. Veliz for pulmonary management, but he did not as he states unable to wear CPAP mask. He reports after residential from his work, he has felt better. In June 2018, he presented to ER at [facility] with complaint of severe chest pain. Labs were negative. Stress and echo were done and no ischemia noted. After treatment with IV Toradol, the pain resolved.  He has been managed conservatively.  He came today for routine follow-up.  He feels well.  Denies CP, SOB, fatigue.  He is watching his diet and has lost about 15 pounds.  Tolerates medications well.  Labs and refills managed with Dr. Do. July 2018, CMP, CBC normal, , TRI 84, HDL 35, LDL 56, A1C 6.8%.    HPI         Cardiac History:    Past Surgical History:   Procedure Laterality Date   • CARDIOVASCULAR STRESS TEST  02/26/2015    Stress Myoview- 9 min, 96% THR. 170/80. negative for ischemia, coreg added, recommended eval for sleep apnea   • CARDIOVASCULAR STRESS TEST  06/26/2018    Lexiscan- negative for ischemia   • ECHO - CONVERTED  02/26/2015    Echo- EF 65%, RVSP- 33mmHg   • ECHO - CONVERTED  06/26/2018    LVEF 60-65%, normal diastolic function, trace to mild MR       Current Outpatient Medications   Medication Sig Dispense Refill   • aspirin 81 MG EC tablet Take 81 mg by mouth Daily.     • atorvastatin (LIPITOR) 40 MG tablet Take 40 mg by mouth Daily.     • carvedilol (COREG) 3.125 MG tablet  Take 3.125 mg by mouth 2 (two) times a day with meals.     • glyBURIDE (DIABETA) 5 MG tablet Take 5 mg by mouth daily.     • lisinopril-hydrochlorothiazide (PRINZIDE,ZESTORETIC) 20-25 MG per tablet Take 1 tablet by mouth daily.     • metFORMIN (GLUCOPHAGE) 1000 MG tablet Take 1,000 mg by mouth daily with breakfast.     • nitroglycerin (NITROSTAT) 0.4 MG SL tablet Place 0.4 mg under the tongue Every 5 (Five) Minutes As Needed for Chest Pain. Take no more than 3 doses in 15 minutes.     • tiZANidine (ZANAFLEX) 4 MG tablet Take 4 mg by mouth every 8 (eight) hours as needed.       No current facility-administered medications for this visit.        Patient has no known allergies.    Past Medical History:   Diagnosis Date   • DDD (degenerative disc disease), cervical     s/p cervical surgery   • Diabetes mellitus (CMS/HCC)     non insulin dependent   • Dyslipidemia    • Hx of tonsillectomy    • Hypertension        Social History     Socioeconomic History   • Marital status:      Spouse name: Not on file   • Number of children: Not on file   • Years of education: Not on file   • Highest education level: Not on file   Tobacco Use   • Smoking status: Never Smoker   • Smokeless tobacco: Current User     Types: Snuff   Substance and Sexual Activity   • Alcohol use: No   • Drug use: No       Family History   Problem Relation Age of Onset   • Hypertension Mother    • Heart attack Mother    • Diabetes Father    • Hypertension Father    • Hypertension Sister    • Hyperlipidemia Sister        Review of Systems   Constitution: Positive for weight loss (down 15 lb ). Negative for decreased appetite, weakness and malaise/fatigue.   HENT: Negative.    Eyes: Negative.    Cardiovascular: Negative for chest pain, dyspnea on exertion, leg swelling, palpitations and syncope.   Respiratory: Negative for cough and shortness of breath.    Endocrine: Negative.    Hematologic/Lymphatic: Negative.    Skin: Negative.    Musculoskeletal:  "Negative for joint pain and myalgias.   Gastrointestinal: Negative for abdominal pain and melena.   Genitourinary: Negative for dysuria and hematuria.   Neurological: Negative for dizziness.   Psychiatric/Behavioral: Negative for altered mental status and depression.   Allergic/Immunologic: Negative.     Diabetes- Yes  Thyroid-normal    Objective     /76 (BP Location: Left arm)   Pulse 80   Ht 177.8 cm (70\")   Wt 105 kg (231 lb 12.8 oz)   BMI 33.26 kg/m²      Physical Exam   Constitutional: He is oriented to person, place, and time. He appears well-developed and well-nourished. No distress.   HENT:   Head: Normocephalic.   Eyes: Pupils are equal, round, and reactive to light.   Neck: Normal range of motion.   Cardiovascular: Normal rate, regular rhythm and intact distal pulses.   Pulmonary/Chest: Effort normal and breath sounds normal.   Abdominal: Soft. Bowel sounds are normal.   Musculoskeletal: Normal range of motion. He exhibits no edema.   Neurological: He is alert and oriented to person, place, and time.   Skin: Skin is warm and dry. He is not diaphoretic.   Psychiatric: He has a normal mood and affect.   Nursing note and vitals reviewed.     Procedures          Assessment/Plan    Heart rate and blood pressure are well controlled. Continue carvedilol, lisinopril/HCTZ.  Labs to be rechecked today.  LDL has been well controlled with Lipitor, so continue the same.  Continues on daily aspirin without bruising/bleeding.  Diabetes management per Dr. Do.  We reviewed his stress test and echocardiogram from 2018 showing no ischemia and normal LV function.  We will continue to manage him conservatively.  He was congratulated on his weight loss efforts and encouraged to continue.  BMI has improved from 35 to 33.  He appears stable.  We will see him back in 6 months or sooner if he develops any new symptoms.  Jann was seen today for follow-up.    Diagnoses and all orders for this visit:    Essential " hypertension    Mixed hyperlipidemia    Obstructive sleep apnea syndrome    Type 2 diabetes mellitus without complication, without long-term current use of insulin (CMS/[facility])        Patient's Body mass index is 33.26 kg/m². BMI is above normal parameters. Recommendations include: nutrition counseling.               Electronically signed by MARY Girard,  July 25, 2019 11:42 AM

## 2020-01-30 ENCOUNTER — OFFICE VISIT (OUTPATIENT)
Dept: CARDIOLOGY | Facility: CLINIC | Age: 55
End: 2020-01-30

## 2020-01-30 VITALS
SYSTOLIC BLOOD PRESSURE: 140 MMHG | HEART RATE: 74 BPM | WEIGHT: 233.2 LBS | DIASTOLIC BLOOD PRESSURE: 72 MMHG | BODY MASS INDEX: 33.39 KG/M2 | HEIGHT: 70 IN

## 2020-01-30 DIAGNOSIS — I10 ESSENTIAL HYPERTENSION: ICD-10-CM

## 2020-01-30 DIAGNOSIS — E78.2 MIXED HYPERLIPIDEMIA: ICD-10-CM

## 2020-01-30 DIAGNOSIS — E11.9 TYPE 2 DIABETES MELLITUS WITHOUT COMPLICATION, WITHOUT LONG-TERM CURRENT USE OF INSULIN (HCC): ICD-10-CM

## 2020-01-30 PROCEDURE — 99213 OFFICE O/P EST LOW 20 MIN: CPT | Performed by: NURSE PRACTITIONER

## 2020-01-30 RX ORDER — GABAPENTIN 300 MG/1
300 CAPSULE ORAL DAILY
COMMUNITY

## 2020-01-30 NOTE — PROGRESS NOTES
Chief Complaint   Patient presents with   • Follow-up     Cardiac management . Had labs in June 2019 per PCP, will  see PCP in March 2020 . Has no cardiac complaints today   • Med Refill     No refills needed today.  PCP manages  refills. Reviewed med list verbally.       Subjective       Jann Barnard is a 54 y.o. male with a history of diabetes, hypertension, hypercholesteremia, and mild metabolic syndrome. He was referred in 2015 for shortness of breath and fatigue.  Stress was negative for ischemia.  Coreg was added for better blood pressure and heart rate control.  He was advised to see Dr. Veliz for pulmonary management, but he did not as he states unable to wear CPAP mask. He reports after shelter from his work, he has felt better. In June 2018, he presented to ER at [facility] with complaint of severe chest pain. Labs were negative. Stress and echo were done and no ischemia noted. After treatment with IV Toradol, the pain resolved.  He has been managed conservatively.      Today he comes to the office for a follow-up visit.  He denies chest pain, palpitations, dizziness, or inappropriate shortness of breath.  He maintains his normal daily activities without issue.  According to patient he is last A1c was slightly elevated and the dose of metformin was increased.  No change in cardiac medications noted.    HPI     Cardiac History:    Past Surgical History:   Procedure Laterality Date   • CARDIOVASCULAR STRESS TEST  02/26/2015    Stress Myoview- 9 min, 96% THR. 170/80. negative for ischemia, coreg added, recommended eval for sleep apnea   • CARDIOVASCULAR STRESS TEST  06/26/2018    Lexiscan- negative for ischemia   • ECHO - CONVERTED  02/26/2015    Echo- EF 65%, RVSP- 33mmHg   • ECHO - CONVERTED  06/26/2018    LVEF 60-65%, normal diastolic function, trace to mild MR       Current Outpatient Medications   Medication Sig Dispense Refill   • aspirin 81 MG EC tablet Take 81 mg by mouth Daily.     • atorvastatin  (LIPITOR) 40 MG tablet Take 40 mg by mouth Daily.     • carvedilol (COREG) 3.125 MG tablet Take 3.125 mg by mouth 2 (two) times a day with meals.     • gabapentin (NEURONTIN) 300 MG capsule Take 300 mg by mouth Daily.     • glyBURIDE (DIABETA) 5 MG tablet Take 5 mg by mouth daily.     • lisinopril-hydrochlorothiazide (PRINZIDE,ZESTORETIC) 20-25 MG per tablet Take 1 tablet by mouth daily.     • metFORMIN (GLUCOPHAGE) 1000 MG tablet Take 1,000 mg by mouth Daily With Breakfast. Takes BID     • nitroglycerin (NITROSTAT) 0.4 MG SL tablet Place 0.4 mg under the tongue Every 5 (Five) Minutes As Needed for Chest Pain. Take no more than 3 doses in 15 minutes.     • tiZANidine (ZANAFLEX) 4 MG tablet Take 4 mg by mouth every 8 (eight) hours as needed.       No current facility-administered medications for this visit.        Patient has no known allergies.    Past Medical History:   Diagnosis Date   • DDD (degenerative disc disease), cervical     s/p cervical surgery   • Diabetes mellitus (CMS/HCC)     non insulin dependent   • Dyslipidemia    • Hx of tonsillectomy    • Hypertension        Social History     Socioeconomic History   • Marital status:      Spouse name: Not on file   • Number of children: Not on file   • Years of education: Not on file   • Highest education level: Not on file   Tobacco Use   • Smoking status: Never Smoker   • Smokeless tobacco: Current User     Types: Snuff   Substance and Sexual Activity   • Alcohol use: No   • Drug use: No       Family History   Problem Relation Age of Onset   • Hypertension Mother    • Heart attack Mother    • Diabetes Father    • Hypertension Father    • Hypertension Sister    • Hyperlipidemia Sister        Review of Systems   Constitution: Negative for decreased appetite, diaphoresis and malaise/fatigue.   HENT: Negative.    Eyes: Negative for visual disturbance.   Cardiovascular: Negative for chest pain, leg swelling, near-syncope and palpitations.   Respiratory:  "Negative for sleep disturbances due to breathing (pt denies symtpoms, was not able to tolerate CPAP in past).    Endocrine: Negative for polydipsia, polyphagia and polyuria.   Hematologic/Lymphatic: Negative.    Musculoskeletal: Positive for arthritis. Negative for muscle weakness.   Gastrointestinal: Negative.    Genitourinary: Negative.    Neurological: Negative.    Psychiatric/Behavioral: Negative.    Allergic/Immunologic: Negative.         Objective      July 2018, CMP, CBC normal, , TRI 84, HDL 35, LDL 56, A1C 6.8%    /72 (BP Location: Right arm)   Pulse 74   Ht 177.8 cm (70\")   Wt 106 kg (233 lb 3.2 oz)   BMI 33.46 kg/m²     Physical Exam   Constitutional: He is oriented to person, place, and time. He appears well-nourished.   HENT:   Head: Normocephalic.   Eyes: Pupils are equal, round, and reactive to light. Conjunctivae are normal.   Neck: Normal range of motion. Neck supple. Carotid bruit is not present.   Cardiovascular: Normal rate, regular rhythm, S1 normal and S2 normal.   No murmur heard.  Pulmonary/Chest: Breath sounds normal. He has no wheezes. He has no rales.   Abdominal: Soft. Bowel sounds are normal. He exhibits no distension. There is no tenderness.   Musculoskeletal: Normal range of motion. He exhibits no edema.   Neurological: He is alert and oriented to person, place, and time.   Skin: Skin is warm and dry. No pallor.   Psychiatric: He has a normal mood and affect. His behavior is normal.        Procedures: none today         Assessment/Plan      Jann was seen today for follow-up and med refill.    Diagnoses and all orders for this visit:    Essential hypertension    Mixed hyperlipidemia    Type 2 diabetes mellitus without complication, without long-term current use of insulin (CMS/[facility])      Hypertension-blood pressure today is normal.  Continue carvedilol 3.25 mg twice daily and Zestoretic 20-25 mg daily.  No refills needed.    Hyperlipidemia- currently on high intensity " statin therapy in the form of Lipitor 4 mg daily without issues noted.  He will follow with you for lab orders/management.  He plans to have labs done in March.  Please forward copy of report when available.    Diabetes-cording to patient home monitor has showed better control of blood glucose.  He will follow with you for further management.    Patient's Body mass index is 33.46 kg/m². BMI is above normal parameters. Recommendations include: nutrition counseling.  Diabetic diet encouraged.     Jann Barnard  reports that he has never smoked. His smokeless tobacco use includes snuff.  We discussed risk of nicotine to cardiovascular health.  I encouraged him to stop using smokeless tobacco.    From a cardiac standpoint Mr. Barnard appears stable at this time.  No cardiac testing warranted.  A 6-month follow-up visit scheduled.  Please call sooner for any cardiac concerns.           Electronically signed by MARY Freitas,  January 30, 2020 5:10 PM

## 2020-07-28 ENCOUNTER — OFFICE VISIT (OUTPATIENT)
Dept: CARDIOLOGY | Facility: CLINIC | Age: 55
End: 2020-07-28

## 2020-07-28 VITALS
HEIGHT: 70 IN | RESPIRATION RATE: 16 BRPM | DIASTOLIC BLOOD PRESSURE: 88 MMHG | BODY MASS INDEX: 33.9 KG/M2 | HEART RATE: 72 BPM | TEMPERATURE: 98.5 F | SYSTOLIC BLOOD PRESSURE: 140 MMHG | WEIGHT: 236.8 LBS

## 2020-07-28 DIAGNOSIS — I10 ESSENTIAL HYPERTENSION: Primary | ICD-10-CM

## 2020-07-28 DIAGNOSIS — E78.2 MIXED HYPERLIPIDEMIA: ICD-10-CM

## 2020-07-28 DIAGNOSIS — G47.33 OBSTRUCTIVE SLEEP APNEA SYNDROME: ICD-10-CM

## 2020-07-28 DIAGNOSIS — E11.9 TYPE 2 DIABETES MELLITUS WITHOUT COMPLICATION, WITHOUT LONG-TERM CURRENT USE OF INSULIN (HCC): ICD-10-CM

## 2020-07-28 PROCEDURE — 99213 OFFICE O/P EST LOW 20 MIN: CPT | Performed by: NURSE PRACTITIONER

## 2021-01-26 ENCOUNTER — OFFICE VISIT (OUTPATIENT)
Dept: CARDIOLOGY | Facility: CLINIC | Age: 56
End: 2021-01-26

## 2021-01-26 VITALS
TEMPERATURE: 97.1 F | SYSTOLIC BLOOD PRESSURE: 140 MMHG | BODY MASS INDEX: 33.79 KG/M2 | DIASTOLIC BLOOD PRESSURE: 90 MMHG | HEIGHT: 70 IN | HEART RATE: 68 BPM | WEIGHT: 236 LBS

## 2021-01-26 DIAGNOSIS — G47.33 OBSTRUCTIVE SLEEP APNEA SYNDROME: ICD-10-CM

## 2021-01-26 DIAGNOSIS — R55 SYNCOPE AND COLLAPSE: Primary | ICD-10-CM

## 2021-01-26 DIAGNOSIS — E11.9 TYPE 2 DIABETES MELLITUS WITHOUT COMPLICATION, WITHOUT LONG-TERM CURRENT USE OF INSULIN (HCC): ICD-10-CM

## 2021-01-26 DIAGNOSIS — E66.9 OBESITY (BMI 30.0-34.9): ICD-10-CM

## 2021-01-26 DIAGNOSIS — E78.2 MIXED HYPERLIPIDEMIA: ICD-10-CM

## 2021-01-26 DIAGNOSIS — R06.02 SHORTNESS OF BREATH: ICD-10-CM

## 2021-01-26 DIAGNOSIS — I10 ESSENTIAL HYPERTENSION: ICD-10-CM

## 2021-01-26 PROCEDURE — 99214 OFFICE O/P EST MOD 30 MIN: CPT | Performed by: NURSE PRACTITIONER

## 2021-01-26 RX ORDER — NITROGLYCERIN 0.4 MG/1
0.4 TABLET SUBLINGUAL
Qty: 25 TABLET | Refills: 0 | Status: SHIPPED | OUTPATIENT
Start: 2021-01-26

## 2021-01-26 RX ORDER — CARVEDILOL 6.25 MG/1
6.25 TABLET ORAL 2 TIMES DAILY
Qty: 180 TABLET | Refills: 3 | Status: SHIPPED | OUTPATIENT
Start: 2021-01-26 | End: 2022-02-07

## 2021-01-26 NOTE — PROGRESS NOTES
Chief Complaint   Patient presents with   • Follow-up     For cardiac management. Patient is on aspirin. Last lab work was done a few months ago per PCP, not in chart, will be having done next month. States that he has been having leg cramps pretty bad. States that he has passed out 2 more times and has lost control of bowels, last time was about a month ago, did not go to the ER.    • Med Refill     Needs refills on nitroglycerin to [facility] in [place]. Went over medications verbally.        Cardiac Complaints  syncope      Subjective   Jann Barnard is a 55 y.o. male with diabetes, hypertension, hypercholesteremia, and mild metabolic syndrome. He was referred in 2015 for shortness of breath and fatigue.  Stress was negative for ischemia.  Coreg was added for better blood pressure and heart rate control.  He was advised to see Dr. Veliz for pulmonary management, but he did not as he states unable to wear CPAP mask. He reports after [employer] from his work, he has felt better. In June 2018, he presented to ER at [facility] with complaint of severe chest pain. Labs were negative. Stress and echo were done and no ischemia noted. After treatment with IV Toradol, the pain resolved.  He has been managed conservatively.     He returns today for follow up and admits to more syncopal episodes. Patient states most recent spell was about a month ago and at that time, he lost control of his bowels. No precipitating factors reported. He admits to sudden onset. Patient denies seeking higher level of care. Labs have remained followed by PCP and were most recently done about a month ago, no current available. He is to have repeat next month. He does report some leg cramps and states his legs have been bothering him more than normal. Refills of NTG requested.      Cardiac History  Past Surgical History:   Procedure Laterality Date   • CARDIOVASCULAR STRESS TEST  02/26/2015    Stress Myoview- 9 min, 96% THR. 170/80. negative  for ischemia, coreg added, recommended eval for sleep apnea   • CARDIOVASCULAR STRESS TEST  06/26/2018    Lexiscan- negative for ischemia   • ECHO - CONVERTED  02/26/2015    Echo- EF 65%, RVSP- 33mmHg   • ECHO - CONVERTED  06/26/2018    LVEF 60-65%, normal diastolic function, trace to mild MR       Current Outpatient Medications   Medication Sig Dispense Refill   • aspirin 81 MG EC tablet Take 81 mg by mouth Daily.     • atorvastatin (LIPITOR) 40 MG tablet Take 40 mg by mouth Daily.     • gabapentin (NEURONTIN) 300 MG capsule Take 300 mg by mouth Daily.     • glyBURIDE (DIABETA) 5 MG tablet Take 5 mg by mouth daily.     • lisinopril-hydrochlorothiazide (PRINZIDE,ZESTORETIC) 20-25 MG per tablet Take 1 tablet by mouth daily.     • metFORMIN (GLUCOPHAGE) 1000 MG tablet Take 1,000 mg by mouth 2 (Two) Times a Day With Meals.     • nitroglycerin (NITROSTAT) 0.4 MG SL tablet Place 1 tablet under the tongue Every 5 (Five) Minutes As Needed for Chest Pain. Take no more than 3 doses in 15 minutes. 25 tablet 0   • tiZANidine (ZANAFLEX) 4 MG tablet Take 4 mg by mouth every 8 (eight) hours as needed.     • carvedilol (COREG) 6.25 MG tablet Take 1 tablet by mouth 2 (Two) Times a Day. 180 tablet 3     No current facility-administered medications for this visit.        Patient has no known allergies.    Past Medical History:   Diagnosis Date   • DDD (degenerative disc disease), cervical     s/p cervical surgery   • Diabetes mellitus (CMS/HCC)     non insulin dependent   • Dyslipidemia    • Hx of tonsillectomy    • Hypertension        Social History     Socioeconomic History   • Marital status:      Spouse name: Not on file   • Number of children: Not on file   • Years of education: Not on file   • Highest education level: Not on file   Tobacco Use   • Smoking status: Never Smoker   • Smokeless tobacco: Current User     Types: Snuff   Substance and Sexual Activity   • Alcohol use: No   • Drug use: No       Family History    "  Problem Relation Age of Onset   • Hypertension Mother    • Heart attack Mother    • Diabetes Father    • Hypertension Father    • Hypertension Sister    • Hyperlipidemia Sister        Review of Systems   Constitution: Negative for malaise/fatigue and night sweats.   Cardiovascular: Negative for chest pain, claudication, dyspnea on exertion, irregular heartbeat, leg swelling, near-syncope, orthopnea, palpitations and syncope.   Respiratory: Positive for shortness of breath. Negative for cough and wheezing.    Musculoskeletal: Positive for myalgias and stiffness. Negative for back pain and joint pain.   Gastrointestinal: Negative for anorexia, heartburn, melena, nausea and vomiting.   Genitourinary: Negative for dysuria, frequency, hematuria, hesitancy and nocturia.   Neurological: Positive for dizziness. Negative for headaches and light-headedness.   Psychiatric/Behavioral: Negative for depression and memory loss. The patient is not nervous/anxious.            Objective     /90 (BP Location: Left arm)   Pulse 68   Temp 97.1 °F (36.2 °C)   Ht 177.8 cm (70\")   Wt 107 kg (236 lb)   BMI 33.86 kg/m²     Constitutional:       Appearance: Healthy appearance. Not in distress.   Eyes:      Pupils: Pupils are equal, round, and reactive to light.   HENT:      Nose: Nose normal.   Neck:      Musculoskeletal: Normal range of motion and neck supple.   Pulmonary:      Effort: Pulmonary effort is normal.      Breath sounds: Normal breath sounds.   Cardiovascular:      PMI at left midclavicular line. Normal rate. Regular rhythm.      Murmurs: There is a systolic murmur.   Abdominal:      Palpations: Abdomen is soft.   Musculoskeletal: Normal range of motion.   Skin:     General: Skin is warm and dry.   Neurological:      Mental Status: Oriented to person, place and time.         Procedures    Assessment/Plan     Syncope:  Patient has noted several bouts of syncope since prior visit. Before this events, there are no " precipitating factors and he describes them as sudden onset often followed by incontinence. Patient strongly urged to discuss with you at next visit. ? Seizure disorder. We will order both an echo with bubble and a carotid US to assess for any cardiac contributing factor (carotid stenosis/shunt) that may be associated with syncope.     HTN:  Blood pressure not well managed. Patient to continue with current zestoretic therapy. He will be urged however to increase coreg therapy to 6.25mg in AM and continue with 3.125mg at night. If BP still not at goal, he will increase to 6.25mg BID. Limited sodium advised.     Hyperlipidemia:  On statin therapy with lipitor. Patient tolerates well. He does have some leg cramps, but unsure if this is low K, vitamin D deficiency, Mag deficiency, or side effects of lipitor. He will have labs with you next month. Please consider adding these labs in regards as well as CK.  For now, current continued.     DM:  On glucophage and diabeta therapy.  Labs including AIC to be checked with you soon. Can we have for our review?    Sleep apnea:  Not using CPAP, as he can not tolerate.    BMI noted at 33.86.  Good cardiac ADA diet encouraged with limited carbs, calories, and walking regimen encouraged.     Refills of NTG sent per request.    6 month follow up recommended or sooner if needed.         Problems Addressed this Visit        Other    Sleep apnea    Diabetes (CMS/[facility])    Hyperlipemia    Essential hypertension    Relevant Medications    carvedilol (COREG) 6.25 MG tablet      Other Visit Diagnoses     Syncope and collapse    -  Primary    Relevant Orders    Adult Transthoracic Echo Complete W/ Cont if Necessary Per Protocol    US Carotid Bilateral    Shortness of breath        Relevant Orders    Adult Transthoracic Echo Complete W/ Cont if Necessary Per Protocol    US Carotid Bilateral    Obesity (BMI 30.0-34.9)          Diagnoses       Codes Comments    Syncope and collapse    -  Primary  ICD-10-CM: R55  ICD-9-CM: 780.2     Shortness of breath     ICD-10-CM: R06.02  ICD-9-CM: 786.05     Essential hypertension     ICD-10-CM: I10  ICD-9-CM: 401.9     Mixed hyperlipidemia     ICD-10-CM: E78.2  ICD-9-CM: 272.2     Obstructive sleep apnea syndrome     ICD-10-CM: G47.33  ICD-9-CM: 327.23     Type 2 diabetes mellitus without complication, without long-term current use of insulin (CMS/[facility])     ICD-10-CM: E11.9  ICD-9-CM: 250.00     Obesity (BMI 30.0-34.9)     ICD-10-CM: E66.9  ICD-9-CM: 278.00           Patient's Body mass index is 33.86 kg/m². BMI is above normal parameters. Recommendations include: nutrition counseling.            Electronically signed by MARY Fisher January 26, 2021 10:36 EST

## 2021-02-02 ENCOUNTER — HOSPITAL ENCOUNTER (OUTPATIENT)
Dept: CARDIOLOGY | Facility: HOSPITAL | Age: 56
Discharge: HOME | End: 2021-02-02

## 2021-02-02 DIAGNOSIS — R55 SYNCOPE AND COLLAPSE: ICD-10-CM

## 2021-02-02 DIAGNOSIS — R06.02 SHORTNESS OF BREATH: ICD-10-CM

## 2021-02-02 PROCEDURE — 93356 MYOCRD STRAIN IMG SPCKL TRCK: CPT | Performed by: INTERNAL MEDICINE

## 2021-02-02 PROCEDURE — 93306 TTE W/DOPPLER COMPLETE: CPT | Performed by: INTERNAL MEDICINE

## 2021-02-02 PROCEDURE — 93306 TTE W/DOPPLER COMPLETE: CPT

## 2021-02-02 PROCEDURE — 93356 MYOCRD STRAIN IMG SPCKL TRCK: CPT

## 2021-02-02 PROCEDURE — 93880 EXTRACRANIAL BILAT STUDY: CPT

## 2021-02-02 PROCEDURE — 93880 EXTRACRANIAL BILAT STUDY: CPT | Performed by: RADIOLOGY

## 2021-02-02 RX ORDER — SODIUM CHLORIDE 9 MG/ML
8 INJECTION INTRAMUSCULAR; INTRAVENOUS; SUBCUTANEOUS ONCE AS NEEDED
Status: DISCONTINUED | OUTPATIENT
Start: 2021-02-02 | End: 2021-02-03 | Stop reason: HOSPADM

## 2021-02-04 LAB
BH CV ECHO MEAS - ACS: 2.1 CM
BH CV ECHO MEAS - AO MAX PG: 6.6 MMHG
BH CV ECHO MEAS - AO MEAN PG: 4 MMHG
BH CV ECHO MEAS - AO ROOT AREA (BSA CORRECTED): 1.5
BH CV ECHO MEAS - AO ROOT AREA: 8.8 CM^2
BH CV ECHO MEAS - AO ROOT DIAM: 3.4 CM
BH CV ECHO MEAS - AO V2 MAX: 128 CM/SEC
BH CV ECHO MEAS - AO V2 MEAN: 89.7 CM/SEC
BH CV ECHO MEAS - AO V2 VTI: 26.8 CM
BH CV ECHO MEAS - BSA(HAYCOCK): 2.3 M^2
BH CV ECHO MEAS - BSA: 2.2 M^2
BH CV ECHO MEAS - BZI_BMI: 33.9 KILOGRAMS/M^2
BH CV ECHO MEAS - BZI_METRIC_HEIGHT: 177.8 CM
BH CV ECHO MEAS - BZI_METRIC_WEIGHT: 107.1 KG
BH CV ECHO MEAS - EDV(CUBED): 77.9 ML
BH CV ECHO MEAS - EDV(MOD-SP4): 97.2 ML
BH CV ECHO MEAS - EDV(TEICH): 81.7 ML
BH CV ECHO MEAS - EF(CUBED): 80.6 %
BH CV ECHO MEAS - EF(MOD-SP4): 55.9 %
BH CV ECHO MEAS - EF(TEICH): 73.5 %
BH CV ECHO MEAS - ESV(CUBED): 15.1 ML
BH CV ECHO MEAS - ESV(MOD-SP4): 42.9 ML
BH CV ECHO MEAS - ESV(TEICH): 21.7 ML
BH CV ECHO MEAS - FS: 42.2 %
BH CV ECHO MEAS - IVS/LVPW: 1
BH CV ECHO MEAS - IVSD: 1.1 CM
BH CV ECHO MEAS - LA DIMENSION: 3.8 CM
BH CV ECHO MEAS - LA/AO: 1.1
BH CV ECHO MEAS - LV DIASTOLIC VOL/BSA (35-75): 43.4 ML/M^2
BH CV ECHO MEAS - LV IVRT: 0.1 SEC
BH CV ECHO MEAS - LV MASS(C)D: 161.2 GRAMS
BH CV ECHO MEAS - LV MASS(C)DI: 72 GRAMS/M^2
BH CV ECHO MEAS - LV SYSTOLIC VOL/BSA (12-30): 19.2 ML/M^2
BH CV ECHO MEAS - LVIDD: 4.3 CM
BH CV ECHO MEAS - LVIDS: 2.5 CM
BH CV ECHO MEAS - LVLD AP4: 8.1 CM
BH CV ECHO MEAS - LVLS AP4: 6.4 CM
BH CV ECHO MEAS - LVOT AREA (M): 2.8 CM^2
BH CV ECHO MEAS - LVOT AREA: 2.8 CM^2
BH CV ECHO MEAS - LVOT DIAM: 1.9 CM
BH CV ECHO MEAS - LVPWD: 1.1 CM
BH CV ECHO MEAS - MV A MAX VEL: 66.8 CM/SEC
BH CV ECHO MEAS - MV DEC SLOPE: 304 CM/SEC^2
BH CV ECHO MEAS - MV E MAX VEL: 78 CM/SEC
BH CV ECHO MEAS - MV E/A: 1.2
BH CV ECHO MEAS - RAP SYSTOLE: 10 MMHG
BH CV ECHO MEAS - RVDD: 3 CM
BH CV ECHO MEAS - RVSP: 26.3 MMHG
BH CV ECHO MEAS - SI(AO): 105.5 ML/M^2
BH CV ECHO MEAS - SI(CUBED): 28 ML/M^2
BH CV ECHO MEAS - SI(MOD-SP4): 24.2 ML/M^2
BH CV ECHO MEAS - SI(TEICH): 26.8 ML/M^2
BH CV ECHO MEAS - SV(AO): 236.2 ML
BH CV ECHO MEAS - SV(CUBED): 62.8 ML
BH CV ECHO MEAS - SV(MOD-SP4): 54.3 ML
BH CV ECHO MEAS - SV(TEICH): 60 ML
BH CV ECHO MEAS - TR MAX VEL: 202 CM/SEC
MAXIMAL PREDICTED HEART RATE: 164 BPM
STRESS TARGET HR: 139 BPM

## 2021-08-31 ENCOUNTER — OFFICE VISIT (OUTPATIENT)
Dept: CARDIOLOGY | Facility: CLINIC | Age: 56
End: 2021-08-31

## 2021-08-31 VITALS
DIASTOLIC BLOOD PRESSURE: 78 MMHG | HEART RATE: 70 BPM | HEIGHT: 70 IN | BODY MASS INDEX: 32.27 KG/M2 | TEMPERATURE: 98 F | SYSTOLIC BLOOD PRESSURE: 138 MMHG | WEIGHT: 225.4 LBS

## 2021-08-31 DIAGNOSIS — I10 ESSENTIAL HYPERTENSION: Primary | ICD-10-CM

## 2021-08-31 DIAGNOSIS — E11.9 TYPE 2 DIABETES MELLITUS WITHOUT COMPLICATION, WITHOUT LONG-TERM CURRENT USE OF INSULIN (HCC): ICD-10-CM

## 2021-08-31 DIAGNOSIS — R55 SYNCOPE AND COLLAPSE: ICD-10-CM

## 2021-08-31 DIAGNOSIS — E78.2 MIXED HYPERLIPIDEMIA: ICD-10-CM

## 2021-08-31 DIAGNOSIS — G47.33 OBSTRUCTIVE SLEEP APNEA SYNDROME: ICD-10-CM

## 2021-08-31 DIAGNOSIS — E66.9 OBESITY (BMI 30.0-34.9): ICD-10-CM

## 2021-08-31 PROCEDURE — 99213 OFFICE O/P EST LOW 20 MIN: CPT | Performed by: NURSE PRACTITIONER

## 2022-02-07 RX ORDER — CARVEDILOL 6.25 MG/1
TABLET ORAL
Qty: 180 TABLET | Refills: 3 | Status: SHIPPED | OUTPATIENT
Start: 2022-02-07

## 2022-05-18 ENCOUNTER — OFFICE VISIT (OUTPATIENT)
Dept: CARDIOLOGY | Facility: CLINIC | Age: 57
End: 2022-05-18

## 2022-05-18 VITALS
DIASTOLIC BLOOD PRESSURE: 78 MMHG | WEIGHT: 219 LBS | HEIGHT: 70 IN | SYSTOLIC BLOOD PRESSURE: 120 MMHG | BODY MASS INDEX: 31.35 KG/M2 | HEART RATE: 72 BPM

## 2022-05-18 DIAGNOSIS — I10 ESSENTIAL HYPERTENSION: ICD-10-CM

## 2022-05-18 DIAGNOSIS — E11.9 TYPE 2 DIABETES MELLITUS WITHOUT COMPLICATION, WITHOUT LONG-TERM CURRENT USE OF INSULIN: ICD-10-CM

## 2022-05-18 DIAGNOSIS — E78.2 MIXED HYPERLIPIDEMIA: Primary | ICD-10-CM

## 2022-05-18 PROCEDURE — 99213 OFFICE O/P EST LOW 20 MIN: CPT | Performed by: NURSE PRACTITIONER

## 2022-05-18 RX ORDER — DAPAGLIFLOZIN 10 MG/1
TABLET, FILM COATED ORAL
COMMUNITY
Start: 2022-05-03

## 2022-05-18 NOTE — PROGRESS NOTES
No chief complaint on file.    Subjective       Jann Barnard is a 57 y.o. male with diabetes, hypertension, hypercholesteremia, and mild metabolic syndrome. He was referred in 2015 for shortness of breath and fatigue.  Stress was negative for ischemia.  Coreg was added for better blood pressure and heart rate control.  He was advised to see Dr. Veliz for pulmonary management, but he did not as he states unable to wear CPAP mask. He reports after alf from his work, he has felt better. In June 2018, he presented to ER at [facility] with complaint of severe chest pain. Labs were negative. Stress and echo were done and no ischemia noted. After treatment with IV Toradol, the pain resolved.  He has been managed conservatively. In Jan 2021, he returned with more syncope with loss of bowel control, he was urged to discuss possible seizure disorder with you. Cardiac workup with echo and carotid US advised. Echo with bubble showed no shunt, EF normal, and only mild MR and AI.  Carotid US was negative for stenosis bilaterally.       He returns today for follow up. No recent CP, SOB, palpitations. No recurrent syncope. Weight is down. He reports blood pressure has been more elevated. Farxiga added. BP well controlled with carvedilol. Following closely with PCP. Labs per Dr. Soto.        Cardiac History:    Past Surgical History:   Procedure Laterality Date   • CARDIOVASCULAR STRESS TEST  02/26/2015    Stress Myoview- 9 min, 96% THR. 170/80. negative for ischemia, coreg added, recommended eval for sleep apnea   • CARDIOVASCULAR STRESS TEST  06/26/2018    Lexiscan- negative for ischemia   • ECHO - CONVERTED  02/26/2015    Echo- EF 65%, RVSP- 33mmHg   • ECHO - CONVERTED  06/26/2018    LVEF 60-65%, normal diastolic function, trace to mild MR   • ECHO - CONVERTED  02/02/2021    EF 60%. Trace MR and AI. No shunt   • US CAROTID UNILATERAL  02/03/2021    No sig lesion     Current Outpatient Medications   Medication Sig Dispense  Refill   • aspirin 81 MG EC tablet Take 81 mg by mouth Daily.     • carvedilol (COREG) 6.25 MG tablet TAKE ONE TABLET BY MOUTH TWICE A  tablet 3   • Farxiga 10 MG tablet      • gabapentin (NEURONTIN) 300 MG capsule Take 300 mg by mouth Daily.     • lisinopril-hydrochlorothiazide (PRINZIDE,ZESTORETIC) 20-25 MG per tablet Take 1 tablet by mouth daily.     • metFORMIN (GLUCOPHAGE) 1000 MG tablet Take 1,000 mg by mouth 2 (Two) Times a Day With Meals.     • nitroglycerin (NITROSTAT) 0.4 MG SL tablet Place 1 tablet under the tongue Every 5 (Five) Minutes As Needed for Chest Pain. Take no more than 3 doses in 15 minutes. 25 tablet 0   • atorvastatin (LIPITOR) 40 MG tablet Take 40 mg by mouth Daily.     • tiZANidine (ZANAFLEX) 4 MG tablet Take 4 mg by mouth every 8 (eight) hours as needed.       No current facility-administered medications for this visit.     Patient has no known allergies.    Past Medical History:   Diagnosis Date   • DDD (degenerative disc disease), cervical     s/p cervical surgery   • Diabetes mellitus (HCC)     non insulin dependent   • Dyslipidemia    • Hx of tonsillectomy    • Hypertension      Social History     Socioeconomic History   • Marital status:    Tobacco Use   • Smoking status: Never Smoker   • Smokeless tobacco: Current User     Types: Snuff   Vaping Use   • Vaping Use: Never used   Substance and Sexual Activity   • Alcohol use: No   • Drug use: No     Family History   Problem Relation Age of Onset   • Hypertension Mother    • Heart attack Mother    • Diabetes Father    • Hypertension Father    • Hypertension Sister    • Hyperlipidemia Sister      Review of Systems   Constitutional: Positive for weight loss (-6lb). Negative for decreased appetite and malaise/fatigue.   HENT: Negative.    Eyes: Negative for blurred vision.   Cardiovascular: Negative for chest pain, dyspnea on exertion, leg swelling, palpitations and syncope.   Respiratory: Negative for shortness of breath and  "sleep disturbances due to breathing.    Endocrine: Negative.    Hematologic/Lymphatic: Negative for bleeding problem. Does not bruise/bleed easily.   Skin: Negative.    Musculoskeletal: Negative for falls and myalgias.   Gastrointestinal: Negative for abdominal pain, heartburn and melena.   Genitourinary: Negative for hematuria.   Neurological: Negative for dizziness and light-headedness.   Psychiatric/Behavioral: Negative for altered mental status.   Allergic/Immunologic: Negative.       Objective     /78 (BP Location: Right arm)   Pulse 72   Ht 177.8 cm (70\")   Wt 99.3 kg (219 lb)   BMI 31.42 kg/m²     Vitals and nursing note reviewed.   Constitutional:       General: Not in acute distress.     Appearance: Well-developed. Not diaphoretic.   Eyes:      Pupils: Pupils are equal, round, and reactive to light.   HENT:      Head: Normocephalic.   Pulmonary:      Effort: Pulmonary effort is normal. No respiratory distress.      Breath sounds: Normal breath sounds.   Cardiovascular:      Normal rate. Regular rhythm.   Pulses:     Intact distal pulses.   Abdominal:      General: Bowel sounds are normal.      Palpations: Abdomen is soft.   Musculoskeletal: Normal range of motion.      Cervical back: Normal range of motion. Skin:     General: Skin is warm and dry.   Neurological:      Mental Status: Alert and oriented to person, place, and time.        Procedures          Problem List Items Addressed This Visit        Cardiac and Vasculature    Hyperlipemia - Primary    Essential hypertension       Endocrine and Metabolic    Diabetes (HCC)    Relevant Medications    Farxiga 10 MG tablet         1. HTN- now well controlled with carvedilol, lisinopril/hctz. Continue same plan.     2. Hyperlipidemia- managed with Lipitor. Tolerating well. Labs per Dr. Soto.     3. Diabetes- recent elevation of blood sugar, A1C in the 9% range. Now on Farxiga. Encouraged low sugar, low carbohydrate diet. Adequate fluid intake.  " Following closely with Dr. Soto for labs.     May need repeat stress test in the next 6 months-1 year to rule out silent ischemia in a diabetic. Report any change in symptoms. FU in 6 months.               Electronically signed by MARY Girard,  May 21, 2022 12:11 EDT

## 2023-09-06 ENCOUNTER — TELEPHONE (OUTPATIENT)
Dept: CARDIOLOGY | Facility: CLINIC | Age: 58
End: 2023-09-06
Payer: COMMERCIAL

## 2023-09-06 NOTE — TELEPHONE ENCOUNTER
Fax - [phone]    Dr Requesting-   Dr Beyer    Procedure-  trial SCS    Date of Procedure-  TBD    Testing-  Echo-  2/2/21   EF 60%. Trace MR and AI. No shunt   Stress- 6/26/18  Lexiscan- negative for ischemia     Meds-   ASA 81 qd

## 2023-10-25 ENCOUNTER — TELEPHONE (OUTPATIENT)
Dept: CARDIOLOGY | Facility: CLINIC | Age: 58
End: 2023-10-25
Payer: COMMERCIAL

## 2023-10-25 NOTE — TELEPHONE ENCOUNTER
Received fax from Dr. Mark Seay for cardiac clearance for patient to have placement of spinal cord simulator. Patient is on aspirin and they are requesting to hold. According to our records, I do not see where patient has had any stenting.          Fax [phone]

## 2024-06-20 ENCOUNTER — OFFICE VISIT (OUTPATIENT)
Dept: CARDIOLOGY | Facility: CLINIC | Age: 59
End: 2024-06-20
Payer: COMMERCIAL

## 2024-06-20 VITALS
HEART RATE: 79 BPM | HEIGHT: 70 IN | SYSTOLIC BLOOD PRESSURE: 106 MMHG | DIASTOLIC BLOOD PRESSURE: 62 MMHG | BODY MASS INDEX: 31.73 KG/M2 | WEIGHT: 221.6 LBS

## 2024-06-20 DIAGNOSIS — E78.2 MIXED HYPERLIPIDEMIA: ICD-10-CM

## 2024-06-20 DIAGNOSIS — Z91.89 CHRONIC CHEST PAIN WITH HIGH RISK FOR CAD: ICD-10-CM

## 2024-06-20 DIAGNOSIS — R06.02 SHORTNESS OF BREATH: ICD-10-CM

## 2024-06-20 DIAGNOSIS — I10 ESSENTIAL HYPERTENSION: Primary | ICD-10-CM

## 2024-06-20 DIAGNOSIS — G89.29 CHRONIC CHEST PAIN WITH HIGH RISK FOR CAD: ICD-10-CM

## 2024-06-20 DIAGNOSIS — R07.9 CHRONIC CHEST PAIN WITH HIGH RISK FOR CAD: ICD-10-CM

## 2024-06-20 PROCEDURE — 99214 OFFICE O/P EST MOD 30 MIN: CPT | Performed by: NURSE PRACTITIONER

## 2024-06-20 PROCEDURE — 93000 ELECTROCARDIOGRAM COMPLETE: CPT | Performed by: NURSE PRACTITIONER

## 2024-06-20 RX ORDER — CARVEDILOL 6.25 MG/1
6.25 TABLET ORAL 2 TIMES DAILY
Qty: 180 TABLET | Refills: 3 | Status: SHIPPED | OUTPATIENT
Start: 2024-06-20

## 2024-06-20 RX ORDER — OMEPRAZOLE 20 MG/1
20 CAPSULE, DELAYED RELEASE ORAL DAILY
COMMUNITY

## 2024-06-20 RX ORDER — GABAPENTIN 800 MG/1
800 TABLET ORAL 3 TIMES DAILY
COMMUNITY

## 2024-06-20 RX ORDER — DULAGLUTIDE 1.5 MG/.5ML
INJECTION, SOLUTION SUBCUTANEOUS WEEKLY
COMMUNITY

## 2024-06-20 NOTE — LETTER
June 26, 2024       No Recipients    Patient: Jann Barnard   YOB: 1965   Date of Visit: 6/20/2024     Dear Reji Do MD:       Thank you for referring Jann Barnard to me for evaluation. Below are the relevant portions of my assessment and plan of care.    If you have questions, please do not hesitate to call me. I look forward to following Jann along with you.         Sincerely,        Magalie MARY Wilson        CC:   No Recipients    Steve MARY Mejias  06/26/24 1627  Incomplete  Chief Complaint   Patient presents with   • Follow-up     Cardiac management   • Lab     Last labs 2 weeks ago per PCP. Reports elevated A1C, now taking Trulicity. He had stimulator placed in neck in November.   • Chest Pain     Having intermittent sharp pains, lasting a few seconds. Has noticed soreness in left chest for a couple days after the sharp pains.   • Blood pressure     Was low at PCP office the other day. Has been feeling more fatigued.   • Med Refill     Needs refills on Coreg-90 day.        HPI:  HPI   Jann Barnard is a 59 y.o. male with diabetes, hypertension, hypercholesteremia, and mild metabolic syndrome. He was referred in 2015 for shortness of breath and fatigue.  Stress was negative for ischemia.  Coreg was added for better blood pressure and heart rate control.  He was advised to see Dr. Veliz for pulmonary management, but he did not as he states unable to wear CPAP mask. He reports after [employer] from his work, he has felt better. In June 2018, he presented to ER at [facility] with complaint of severe chest pain. Labs were negative. Stress and echo were done and no ischemia noted. After treatment with IV Toradol, the pain resolved.  He has been managed conservatively. In Jan 2021, he returned with more syncope with loss of bowel control, he was urged to discuss possible seizure disorder with you. Cardiac workup with echo and carotid US advised. Echo with bubble showed no shunt, EF  normal, and only mild MR and AI.  Carotid US was negative for stenosis bilaterally.       He returns today for yearly follow up. Patient denies chest  pressure, palpitations, tightness, dizziness.  He is reporting intermittent sharp chest pains lasting a few seconds and has noticed shortness of breath for the last several days and he is reporting feeling more fatigued.  Labs were done 2 weeks ago with PCP and not available for review today but he does report that his A1c was elevated    Cardiac History:    Past Surgical History:   Procedure Laterality Date   • CARDIOVASCULAR STRESS TEST  02/26/2015    Stress Myoview- 9 min, 96% THR. 170/80. negative for ischemia, coreg added, recommended eval for sleep apnea   • CARDIOVASCULAR STRESS TEST  06/26/2018    Lexiscan- negative for ischemia   • ECHO - CONVERTED  02/26/2015    Echo- EF 65%, RVSP- 33mmHg   • ECHO - CONVERTED  06/26/2018    LVEF 60-65%, normal diastolic function, trace to mild MR   • ECHO - CONVERTED  02/02/2021    EF 60%. Trace MR and AI. No shunt   • US CAROTID UNILATERAL  02/03/2021    No sig lesion       Current Outpatient Medications   Medication Sig Dispense Refill   • aspirin 81 MG EC tablet Take 1 tablet by mouth Daily.     • atorvastatin (LIPITOR) 40 MG tablet Take 1 tablet by mouth Daily.     • carvedilol (COREG) 6.25 MG tablet Take 1 tablet by mouth 2 (Two) Times a Day. 180 tablet 3   • Dulaglutide (Trulicity) 1.5 MG/0.5ML solution pen-injector Inject  under the skin into the appropriate area as directed 1 (One) Time Per Week.     • gabapentin (NEURONTIN) 800 MG tablet Take 1 tablet by mouth 3 (Three) Times a Day.     • lisinopril-hydrochlorothiazide (PRINZIDE,ZESTORETIC) 20-25 MG per tablet Take 1 tablet by mouth Daily.     • metFORMIN (GLUCOPHAGE) 1000 MG tablet Take 1 tablet by mouth Daily With Breakfast.     • nitroglycerin (NITROSTAT) 0.4 MG SL tablet Place 1 tablet under the tongue Every 5 (Five) Minutes As Needed for Chest Pain. Take no  "more than 3 doses in 15 minutes. 25 tablet 0   • omeprazole (priLOSEC) 20 MG capsule Take 1 capsule by mouth Daily.       No current facility-administered medications for this visit.       Patient has no known allergies.    Past Medical History:   Diagnosis Date   • DDD (degenerative disc disease), cervical     s/p cervical surgery   • Diabetes mellitus     non insulin dependent   • Dyslipidemia    • Hx of tonsillectomy    • Hypertension    • S/P insertion of spinal cord stimulator        Social History     Socioeconomic History   • Marital status:    Tobacco Use   • Smoking status: Never   • Smokeless tobacco: Current     Types: Snuff   Vaping Use   • Vaping status: Never Used   Substance and Sexual Activity   • Alcohol use: No   • Drug use: No   • Sexual activity: Defer       Family History   Problem Relation Age of Onset   • Hypertension Mother    • Heart attack Mother    • Diabetes Father    • Hypertension Father    • Hypertension Sister    • Hyperlipidemia Sister        Vitals:   /62 (BP Location: Left arm, Patient Position: Sitting)   Pulse 79   Ht 177.8 cm (70\")   Wt 101 kg (221 lb 9.6 oz)   BMI 31.80 kg/m²     Physical Exam  Vitals and nursing note reviewed.   Constitutional:       Appearance: He is obese.   Neck:      Vascular: No carotid bruit.   Cardiovascular:      Rate and Rhythm: Normal rate and regular rhythm.      Pulses: Normal pulses.      Heart sounds: Normal heart sounds. No murmur heard.     No friction rub. No gallop.   Pulmonary:      Effort: Pulmonary effort is normal.      Breath sounds: Normal breath sounds and air entry.   Musculoskeletal:      Right lower leg: No edema.      Left lower leg: No edema.   Skin:     General: Skin is warm and dry.      Capillary Refill: Capillary refill takes less than 2 seconds.   Neurological:      Mental Status: He is alert and oriented to person, place, and time.         ECG 12 Lead    Date/Time: 6/20/2024 3:59 PM  Performed by: Steve " MARY Mejias    Authorized by: Magalie Wilson APRN  Comparison: compared with previous ECG from 2/12/2015  Similar to previous ECG  Rhythm: sinus rhythm  Rate: normal  BPM: 79    Clinical impression: normal ECG  Comments: Qtc 383 ms           Assessment & Plan    Diagnoses and all orders for this visit:    1. Essential hypertension (Primary)    2. Mixed hyperlipidemia    3. Chronic chest pain with high risk for CAD  -     Adult Transthoracic Echo Complete W/ Cont if Necessary Per Protocol; Future  -     Stress Test With Myocardial Perfusion One Day; Future  -     ECG 12 Lead    4. Shortness of breath  -     Adult Transthoracic Echo Complete W/ Cont if Necessary Per Protocol; Future  -     Stress Test With Myocardial Perfusion One Day; Future  -     ECG 12 Lead    Other orders  -     carvedilol (COREG) 6.25 MG tablet; Take 1 tablet by mouth 2 (Two) Times a Day.  Dispense: 180 tablet; Refill: 3    Hypertension  - BP controlled  - Continue current hypertensive medication regimen including carvedilol, lisinopril-HCTZ    Hyperlipidemia  - Labs managed with PCP  - Continue statin therapy; atorvastatin 40 mg    Chest pain/shortness of breath  - Recommend repeating stress test and echocardiogram to evaluate for ischemia, LV function, EF, valvular structure and function, pulmonary artery pressure    Recommend repeat stress and echo. Refill coreg. No med changes made today EKG SR [id number], Normal QT interval     Visit Diagnoses:    ICD-10-CM ICD-9-CM   1. Essential hypertension  I10 401.9   2. Mixed hyperlipidemia  E78.2 272.2   3. Chronic chest pain with high risk for CAD  R07.9 786.50    G89.29 338.29    Z91.89 V15.89   4. Shortness of breath  R06.02 786.05       Meds Ordered During Visit:  New Medications Ordered This Visit   Medications   • carvedilol (COREG) 6.25 MG tablet     Sig: Take 1 tablet by mouth 2 (Two) Times a Day.     Dispense:  180 tablet     Refill:  3       Follow Up Appointment:   Return in about 6 months  (around 12/20/2024), or if symptoms worsen or fail to improve.           This document has been electronically signed by MARY Tomas  June 26, 2024 16:27 EDT    Dictated Utilizing Dragon Dictation: Part of this note may be an electronic transcription/translation of spoken language to printed text using the Dragon Dictation System.           SteveAnyiny, MARY  06/20/24 1601  Incomplete  Chief Complaint   Patient presents with   • Follow-up     Cardiac management   • Lab     Last labs 2 weeks ago per PCP. Reports elevated A1C, now taking Trulicity. He had stimulator placed in neck in November.   • Chest Pain     Having intermittent sharp pains, lasting a few seconds. Has noticed soreness in left chest for a couple days after the sharp pains.   • Blood pressure     Was low at PCP office the other day. Has been feeling more fatigued.   • Med Refill     Needs refills on Coreg-90 day.        HPI:  HPI   Jann Barnard is a 59 y.o. male with diabetes, hypertension, hypercholesteremia, and mild metabolic syndrome. He was referred in 2015 for shortness of breath and fatigue.  Stress was negative for ischemia.  Coreg was added for better blood pressure and heart rate control.  He was advised to see Dr. Veliz for pulmonary management, but he did not as he states unable to wear CPAP mask. He reports after [employer] from his work, he has felt better. In June 2018, he presented to ER at [facility] with complaint of severe chest pain. Labs were negative. Stress and echo were done and no ischemia noted. After treatment with IV Toradol, the pain resolved.  He has been managed conservatively. In Jan 2021, he returned with more syncope with loss of bowel control, he was urged to discuss possible seizure disorder with you. Cardiac workup with echo and carotid US advised. Echo with bubble showed no shunt, EF normal, and only mild MR and AI.  Carotid US was negative for stenosis bilaterally.       He returns today for yearly follow  up.       Cardiac History:    Past Surgical History:   Procedure Laterality Date   • CARDIOVASCULAR STRESS TEST  02/26/2015    Stress Myoview- 9 min, 96% THR. 170/80. negative for ischemia, coreg added, recommended eval for sleep apnea   • CARDIOVASCULAR STRESS TEST  06/26/2018    Lexiscan- negative for ischemia   • ECHO - CONVERTED  02/26/2015    Echo- EF 65%, RVSP- 33mmHg   • ECHO - CONVERTED  06/26/2018    LVEF 60-65%, normal diastolic function, trace to mild MR   • ECHO - CONVERTED  02/02/2021    EF 60%. Trace MR and AI. No shunt   • US CAROTID UNILATERAL  02/03/2021    No sig lesion       Current Outpatient Medications   Medication Sig Dispense Refill   • aspirin 81 MG EC tablet Take 1 tablet by mouth Daily.     • atorvastatin (LIPITOR) 40 MG tablet Take 1 tablet by mouth Daily.     • carvedilol (COREG) 6.25 MG tablet Take 1 tablet by mouth 2 (Two) Times a Day. 180 tablet 3   • Dulaglutide (Trulicity) 1.5 MG/0.5ML solution pen-injector Inject  under the skin into the appropriate area as directed 1 (One) Time Per Week.     • gabapentin (NEURONTIN) 800 MG tablet Take 1 tablet by mouth 3 (Three) Times a Day.     • lisinopril-hydrochlorothiazide (PRINZIDE,ZESTORETIC) 20-25 MG per tablet Take 1 tablet by mouth Daily.     • metFORMIN (GLUCOPHAGE) 1000 MG tablet Take 1 tablet by mouth Daily With Breakfast.     • nitroglycerin (NITROSTAT) 0.4 MG SL tablet Place 1 tablet under the tongue Every 5 (Five) Minutes As Needed for Chest Pain. Take no more than 3 doses in 15 minutes. 25 tablet 0   • omeprazole (priLOSEC) 20 MG capsule Take 1 capsule by mouth Daily.     • cyclobenzaprine (FLEXERIL) 10 MG tablet Take 1 tablet by mouth Every Night.     • Dulaglutide (Trulicity) 0.75 MG/0.5ML solution pen-injector Inject  under the skin into the appropriate area as directed 1 (One) Time Per Week.     • gabapentin (NEURONTIN) 300 MG capsule Take 1 capsule by mouth Daily.       No current facility-administered medications for this  "visit.       Patient has no known allergies.    Past Medical History:   Diagnosis Date   • DDD (degenerative disc disease), cervical     s/p cervical surgery   • Diabetes mellitus     non insulin dependent   • Dyslipidemia    • Hx of tonsillectomy    • Hypertension    • S/P insertion of spinal cord stimulator        Social History     Socioeconomic History   • Marital status:    Tobacco Use   • Smoking status: Never   • Smokeless tobacco: Current     Types: Snuff   Vaping Use   • Vaping status: Never Used   Substance and Sexual Activity   • Alcohol use: No   • Drug use: No   • Sexual activity: Defer       Family History   Problem Relation Age of Onset   • Hypertension Mother    • Heart attack Mother    • Diabetes Father    • Hypertension Father    • Hypertension Sister    • Hyperlipidemia Sister        Vitals:   Ht 177.8 cm (70\")   Wt 101 kg (221 lb 9.6 oz)   BMI 31.80 kg/m²     Physical Exam  Vitals and nursing note reviewed.   Constitutional:       Appearance: He is obese.   Neck:      Vascular: No carotid bruit.   Cardiovascular:      Rate and Rhythm: Normal rate and regular rhythm.      Pulses: Normal pulses.      Heart sounds: Normal heart sounds. No murmur heard.     No friction rub. No gallop.   Pulmonary:      Effort: Pulmonary effort is normal.      Breath sounds: Normal breath sounds and air entry.   Musculoskeletal:      Right lower leg: No edema.      Left lower leg: No edema.   Skin:     General: Skin is warm and dry.      Capillary Refill: Capillary refill takes less than 2 seconds.   Neurological:      Mental Status: He is alert and oriented to person, place, and time.       ECG 12 Lead    Date/Time: 6/20/2024 3:59 PM  Performed by: Magalie Wilson APRN    Authorized by: Magalie Wilson APRN  Comparison: compared with previous ECG from 2/12/2015  Similar to previous ECG  Rhythm: sinus rhythm  Rate: normal  BPM: 79    Clinical impression: normal ECG  Comments: Qtc 383 ms         Assessment & Plan "     Diagnoses and all orders for this visit:    1. Essential hypertension (Primary)    2. Mixed hyperlipidemia    3. Obstructive sleep apnea syndrome    Recommend repeat stress and echo. Refill coreg. No med changes made today EKG SR [id number], Normal QT interval     Visit Diagnoses:    ICD-10-CM ICD-9-CM   1. Essential hypertension  I10 401.9   2. Mixed hyperlipidemia  E78.2 272.2   3. Obstructive sleep apnea syndrome  G47.33 327.23       Meds Ordered During Visit:  No orders of the defined types were placed in this encounter.      Follow Up Appointment:   No follow-ups on file.           This document has been electronically signed by MARY Tomas  June 20, 2024 15:45 EDT    Dictated Utilizing Dragon Dictation: Part of this note may be an electronic transcription/translation of spoken language to printed text using the Dragon Dictation System.

## 2024-06-20 NOTE — PROGRESS NOTES
Chief Complaint   Patient presents with    Follow-up     Cardiac management    Lab     Last labs 2 weeks ago per PCP. Reports elevated A1C, now taking Trulicity. He had stimulator placed in neck in November.    Chest Pain     Having intermittent sharp pains, lasting a few seconds. Has noticed soreness in left chest for a couple days after the sharp pains.    Blood pressure     Was low at PCP office the other day. Has been feeling more fatigued.    Med Refill     Needs refills on Coreg-90 day.        HPI:  HPI   Jann Barnard is a 59 y.o. male with diabetes, hypertension, hypercholesteremia, and mild metabolic syndrome. He was referred in 2015 for shortness of breath and fatigue.  Stress was negative for ischemia.  Coreg was added for better blood pressure and heart rate control.  He was advised to see Dr. Veliz for pulmonary management, but he did not as he states unable to wear CPAP mask. He reports after [employer] from his work, he has felt better. In June 2018, he presented to ER at [facility] with complaint of severe chest pain. Labs were negative. Stress and echo were done and no ischemia noted. After treatment with IV Toradol, the pain resolved.  He has been managed conservatively. In Jan 2021, he returned with more syncope with loss of bowel control, he was urged to discuss possible seizure disorder with you. Cardiac workup with echo and carotid US advised. Echo with bubble showed no shunt, EF normal, and only mild MR and AI.  Carotid US was negative for stenosis bilaterally.       He returns today for yearly follow up. Patient denies chest  pressure, palpitations, tightness, dizziness.  He is reporting intermittent sharp chest pains lasting a few seconds and has noticed shortness of breath for the last several days and he is reporting feeling more fatigued.  Labs were done 2 weeks ago with PCP and not available for review today but he does report that his A1c was elevated    Cardiac History:    Past Surgical  History:   Procedure Laterality Date    CARDIOVASCULAR STRESS TEST  02/26/2015    Stress Myoview- 9 min, 96% THR. 170/80. negative for ischemia, coreg added, recommended eval for sleep apnea    CARDIOVASCULAR STRESS TEST  06/26/2018    Lexiscan- negative for ischemia    ECHO - CONVERTED  02/26/2015    Echo- EF 65%, RVSP- 33mmHg    ECHO - CONVERTED  06/26/2018    LVEF 60-65%, normal diastolic function, trace to mild MR    ECHO - CONVERTED  02/02/2021    EF 60%. Trace MR and AI. No shunt    US CAROTID UNILATERAL  02/03/2021    No sig lesion       Current Outpatient Medications   Medication Sig Dispense Refill    aspirin 81 MG EC tablet Take 1 tablet by mouth Daily.      atorvastatin (LIPITOR) 40 MG tablet Take 1 tablet by mouth Daily.      carvedilol (COREG) 6.25 MG tablet Take 1 tablet by mouth 2 (Two) Times a Day. 180 tablet 3    Dulaglutide (Trulicity) 1.5 MG/0.5ML solution pen-injector Inject  under the skin into the appropriate area as directed 1 (One) Time Per Week.      gabapentin (NEURONTIN) 800 MG tablet Take 1 tablet by mouth 3 (Three) Times a Day.      lisinopril-hydrochlorothiazide (PRINZIDE,ZESTORETIC) 20-25 MG per tablet Take 1 tablet by mouth Daily.      metFORMIN (GLUCOPHAGE) 1000 MG tablet Take 1 tablet by mouth Daily With Breakfast.      nitroglycerin (NITROSTAT) 0.4 MG SL tablet Place 1 tablet under the tongue Every 5 (Five) Minutes As Needed for Chest Pain. Take no more than 3 doses in 15 minutes. 25 tablet 0    omeprazole (priLOSEC) 20 MG capsule Take 1 capsule by mouth Daily.       No current facility-administered medications for this visit.       Patient has no known allergies.    Past Medical History:   Diagnosis Date    DDD (degenerative disc disease), cervical     s/p cervical surgery    Diabetes mellitus     non insulin dependent    Dyslipidemia     Hx of tonsillectomy     Hypertension     S/P insertion of spinal cord stimulator        Social History     Socioeconomic History    Marital  "status:    Tobacco Use    Smoking status: Never    Smokeless tobacco: Current     Types: Snuff   Vaping Use    Vaping status: Never Used   Substance and Sexual Activity    Alcohol use: No    Drug use: No    Sexual activity: Defer       Family History   Problem Relation Age of Onset    Hypertension Mother     Heart attack Mother     Diabetes Father     Hypertension Father     Hypertension Sister     Hyperlipidemia Sister        Vitals:   /62 (BP Location: Left arm, Patient Position: Sitting)   Pulse 79   Ht 177.8 cm (70\")   Wt 101 kg (221 lb 9.6 oz)   BMI 31.80 kg/m²     Physical Exam  Vitals and nursing note reviewed.   Constitutional:       Appearance: He is obese.   Neck:      Vascular: No carotid bruit.   Cardiovascular:      Rate and Rhythm: Normal rate and regular rhythm.      Pulses: Normal pulses.      Heart sounds: Normal heart sounds. No murmur heard.     No friction rub. No gallop.   Pulmonary:      Effort: Pulmonary effort is normal.      Breath sounds: Normal breath sounds and air entry.   Musculoskeletal:      Right lower leg: No edema.      Left lower leg: No edema.   Skin:     General: Skin is warm and dry.      Capillary Refill: Capillary refill takes less than 2 seconds.   Neurological:      Mental Status: He is alert and oriented to person, place, and time.         ECG 12 Lead    Date/Time: 6/20/2024 3:59 PM  Performed by: Magalie Wilson APRN    Authorized by: Magalie Wilson APRN  Comparison: compared with previous ECG from 2/12/2015  Similar to previous ECG  Rhythm: sinus rhythm  Rate: normal  BPM: 79    Clinical impression: normal ECG  Comments: Qtc 383 ms           Assessment & Plan     Diagnoses and all orders for this visit:    1. Essential hypertension (Primary)    2. Mixed hyperlipidemia    3. Chronic chest pain with high risk for CAD  -     Adult Transthoracic Echo Complete W/ Cont if Necessary Per Protocol; Future  -     Stress Test With Myocardial Perfusion One Day; " Future  -     ECG 12 Lead    4. Shortness of breath  -     Adult Transthoracic Echo Complete W/ Cont if Necessary Per Protocol; Future  -     Stress Test With Myocardial Perfusion One Day; Future  -     ECG 12 Lead    Other orders  -     carvedilol (COREG) 6.25 MG tablet; Take 1 tablet by mouth 2 (Two) Times a Day.  Dispense: 180 tablet; Refill: 3    Hypertension  - BP controlled  - Continue current hypertensive medication regimen including carvedilol, lisinopril-HCTZ    Hyperlipidemia  - Labs managed with PCP  - Continue statin therapy; atorvastatin 40 mg    Chest pain/shortness of breath  - Recommend repeating stress test and echocardiogram to evaluate for ischemia, LV function, EF, valvular structure and function, pulmonary artery pressure    Recommend repeat stress and echo. Refill coreg. No med changes made today EKG SR [id number], Normal QT interval     Visit Diagnoses:    ICD-10-CM ICD-9-CM   1. Essential hypertension  I10 401.9   2. Mixed hyperlipidemia  E78.2 272.2   3. Chronic chest pain with high risk for CAD  R07.9 786.50    G89.29 338.29    Z91.89 V15.89   4. Shortness of breath  R06.02 786.05       Meds Ordered During Visit:  New Medications Ordered This Visit   Medications    carvedilol (COREG) 6.25 MG tablet     Sig: Take 1 tablet by mouth 2 (Two) Times a Day.     Dispense:  180 tablet     Refill:  3       Follow Up Appointment:   Return in about 6 months (around 12/20/2024), or if symptoms worsen or fail to improve.           This document has been electronically signed by MARY Tomas  June 26, 2024 16:27 EDT    Dictated Utilizing Dragon Dictation: Part of this note may be an electronic transcription/translation of spoken language to printed text using the Dragon Dictation System.

## 2024-07-18 ENCOUNTER — HOSPITAL ENCOUNTER (OUTPATIENT)
Dept: CARDIOLOGY | Facility: HOSPITAL | Age: 59
Discharge: HOME | End: 2024-07-18
Payer: COMMERCIAL

## 2024-07-18 DIAGNOSIS — Z91.89 CHRONIC CHEST PAIN WITH HIGH RISK FOR CAD: ICD-10-CM

## 2024-07-18 DIAGNOSIS — R07.9 CHRONIC CHEST PAIN WITH HIGH RISK FOR CAD: ICD-10-CM

## 2024-07-18 DIAGNOSIS — G89.29 CHRONIC CHEST PAIN WITH HIGH RISK FOR CAD: ICD-10-CM

## 2024-07-18 DIAGNOSIS — R06.02 SHORTNESS OF BREATH: ICD-10-CM

## 2024-07-18 LAB
BH CV ECHO MEAS - ACS: 2.17 CM
BH CV ECHO MEAS - AO MAX PG: 4.2 MMHG
BH CV ECHO MEAS - AO MEAN PG: 2 MMHG
BH CV ECHO MEAS - AO ROOT DIAM: 3.8 CM
BH CV ECHO MEAS - AO V2 MAX: 102 CM/SEC
BH CV ECHO MEAS - AO V2 VTI: 21.2 CM
BH CV ECHO MEAS - EDV(CUBED): 44.4 ML
BH CV ECHO MEAS - EDV(MOD-SP4): 98.9 ML
BH CV ECHO MEAS - EF(MOD-SP4): 42 %
BH CV ECHO MEAS - EF_3D-VOL: 52 %
BH CV ECHO MEAS - ESV(CUBED): 10.4 ML
BH CV ECHO MEAS - ESV(MOD-SP4): 57.4 ML
BH CV ECHO MEAS - FS: 38.4 %
BH CV ECHO MEAS - IVS/LVPW: 1.02 CM
BH CV ECHO MEAS - IVSD: 1.17 CM
BH CV ECHO MEAS - LA DIMENSION: 3.5 CM
BH CV ECHO MEAS - LAT PEAK E' VEL: 10 CM/SEC
BH CV ECHO MEAS - LV DIASTOLIC VOL/BSA (35-75): 45.4 CM2
BH CV ECHO MEAS - LV MASS(C)D: 131.1 GRAMS
BH CV ECHO MEAS - LV SYSTOLIC VOL/BSA (12-30): 26.4 CM2
BH CV ECHO MEAS - LVIDD: 3.5 CM
BH CV ECHO MEAS - LVIDS: 2.18 CM
BH CV ECHO MEAS - LVPWD: 1.15 CM
BH CV ECHO MEAS - MED PEAK E' VEL: 7.7 CM/SEC
BH CV ECHO MEAS - MV A MAX VEL: 73.3 CM/SEC
BH CV ECHO MEAS - MV DEC TIME: 0.21 SEC
BH CV ECHO MEAS - MV E MAX VEL: 62.1 CM/SEC
BH CV ECHO MEAS - MV E/A: 0.85
BH CV ECHO MEAS - RVDD: 2.5 CM
BH CV ECHO MEAS - SV(MOD-SP4): 41.5 ML
BH CV ECHO MEAS - SVI(MOD-SP4): 19.1 ML/M2
BH CV ECHO MEASUREMENTS AVERAGE E/E' RATIO: 7.02
BH CV REST NUCLEAR ISOTOPE DOSE: 10 MCI
BH CV STRESS COMMENTS STAGE 1: NORMAL
BH CV STRESS DOSE REGADENOSON STAGE 1: 0.4
BH CV STRESS DURATION MIN STAGE 1: 0
BH CV STRESS DURATION SEC STAGE 1: 10
BH CV STRESS NUCLEAR ISOTOPE DOSE: 30 MCI
BH CV STRESS PROTOCOL 1: NORMAL
BH CV STRESS RECOVERY BP: NORMAL MMHG
BH CV STRESS RECOVERY HR: 89 BPM
BH CV STRESS STAGE 1: 1
LEFT ATRIUM VOLUME INDEX: 16.1 ML/M2
LV EF NUC BP: 73 %
MAXIMAL PREDICTED HEART RATE: 160 BPM
PERCENT MAX PREDICTED HR: 66.88 %
STRESS BASELINE BP: NORMAL MMHG
STRESS BASELINE HR: 71 BPM
STRESS PERCENT HR: 79 %
STRESS POST PEAK BP: NORMAL MMHG
STRESS POST PEAK HR: 107 BPM
STRESS TARGET HR: 136 BPM

## 2024-07-18 PROCEDURE — 0 TECHNETIUM SESTAMIBI: Performed by: INTERNAL MEDICINE

## 2024-07-18 PROCEDURE — A9500 TC99M SESTAMIBI: HCPCS | Performed by: INTERNAL MEDICINE

## 2024-07-18 PROCEDURE — 25010000002 REGADENOSON 0.4 MG/5ML SOLUTION: Performed by: INTERNAL MEDICINE

## 2024-07-18 PROCEDURE — 93017 CV STRESS TEST TRACING ONLY: CPT

## 2024-07-18 PROCEDURE — 93306 TTE W/DOPPLER COMPLETE: CPT

## 2024-07-18 PROCEDURE — 78452 HT MUSCLE IMAGE SPECT MULT: CPT

## 2024-07-18 RX ORDER — REGADENOSON 0.08 MG/ML
0.4 INJECTION, SOLUTION INTRAVENOUS
Status: COMPLETED | OUTPATIENT
Start: 2024-07-18 | End: 2024-07-18

## 2024-07-18 RX ADMIN — TECHNETIUM TC 99M SESTAMIBI 1 DOSE: 1 INJECTION INTRAVENOUS at 10:53

## 2024-07-18 RX ADMIN — REGADENOSON 0.4 MG: 0.08 INJECTION, SOLUTION INTRAVENOUS at 10:52

## 2024-07-18 RX ADMIN — TECHNETIUM TC 99M SESTAMIBI 1 DOSE: 1 INJECTION INTRAVENOUS at 09:23

## 2024-10-09 ENCOUNTER — TELEPHONE (OUTPATIENT)
Dept: CARDIOLOGY | Facility: CLINIC | Age: 59
End: 2024-10-09
Payer: COMMERCIAL

## 2024-10-09 NOTE — TELEPHONE ENCOUNTER
Received fax from Dr. Mark Seay for cardiac clearance for patient to have an L4-5 PLIF. Patient is on aspirin and they are requesting to hold. According to our records, I do not see where patient has had any stenting.          Fax [phone]

## 2024-10-30 RX ORDER — CARVEDILOL 6.25 MG/1
6.25 TABLET ORAL 2 TIMES DAILY
Qty: 180 TABLET | Refills: 3 | Status: SHIPPED | OUTPATIENT
Start: 2024-10-30

## 2024-10-30 NOTE — TELEPHONE ENCOUNTER
Rx Refill Note  Requested Prescriptions     Pending Prescriptions Disp Refills    carvedilol (COREG) 6.25 MG tablet [Pharmacy Med Name: CARVEDILOL 6.25 MG TABLET] 180 tablet 3     Sig: TAKE 1 TABLET BY MOUTH TWICE A DAY      Last office visit with prescribing clinician: 6/20/2023   Last telemedicine visit with prescribing clinician: Visit date not found   Next office visit with prescribing clinician: Visit date not found                         Would you like a call back once the refill request has been completed: [] Yes [] No    If the office needs to give you a call back, can they leave a voicemail: [] Yes [] No    Bri Aguilar CMA  10/30/24, 11:11 EDT

## 2024-11-08 ENCOUNTER — TELEPHONE (OUTPATIENT)
Dept: CARDIOLOGY | Facility: CLINIC | Age: 59
End: 2024-11-08

## 2024-11-08 NOTE — TELEPHONE ENCOUNTER
Caller: [email or web address]    Relationship: Other    Best call back number: [phone]     What form or medical record are you requesting: STRESS TEST REPORT FROM 7.18.24    Who is requesting this form or medical record from you: [facility] [employer] [facility] [employer]    How would you like to receive the form or medical records (pick-up, mail, fax): FAX  If fax, what is the fax number: [phone]    Timeframe paperwork needed: 11.18.24    Additional notes: RECEIVED ALL OTHER PAPERWORK NEEDED, EXCEPT FOR STRESS TEST. JUST NEEDS REPORT, NOT IMAGING.

## 2024-11-19 NOTE — TELEPHONE ENCOUNTER
[facility] HAS YET TO GET STRESS TEST RESULTS WHICH THEY NEED PLEASE SEND THAT OVER [facility], PLEASE FAX [phone]

## 2024-11-20 NOTE — TELEPHONE ENCOUNTER
This is for a [employer]. This request must be completed from [employer] team per set workflow. Forwarded to [employer]